# Patient Record
Sex: FEMALE | Race: WHITE | Employment: FULL TIME | URBAN - NONMETROPOLITAN AREA
[De-identification: names, ages, dates, MRNs, and addresses within clinical notes are randomized per-mention and may not be internally consistent; named-entity substitution may affect disease eponyms.]

---

## 2017-05-25 LAB — FASTING: YES

## 2019-04-12 ENCOUNTER — OFFICE VISIT (OUTPATIENT)
Dept: FAMILY MEDICINE CLINIC | Age: 54
End: 2019-04-12
Payer: COMMERCIAL

## 2019-04-12 VITALS
RESPIRATION RATE: 10 BRPM | TEMPERATURE: 98.8 F | DIASTOLIC BLOOD PRESSURE: 88 MMHG | BODY MASS INDEX: 35.12 KG/M2 | WEIGHT: 198.2 LBS | HEART RATE: 86 BPM | SYSTOLIC BLOOD PRESSURE: 128 MMHG | HEIGHT: 63 IN | OXYGEN SATURATION: 98 %

## 2019-04-12 DIAGNOSIS — E06.3 HASHITOXICOSIS: ICD-10-CM

## 2019-04-12 DIAGNOSIS — G44.89 OTHER HEADACHE SYNDROME: ICD-10-CM

## 2019-04-12 DIAGNOSIS — R53.83 TIRED: ICD-10-CM

## 2019-04-12 DIAGNOSIS — N31.2 HYPOTONIC BLADDER: Primary | ICD-10-CM

## 2019-04-12 DIAGNOSIS — G47.9 SLEEP DIFFICULTIES: ICD-10-CM

## 2019-04-12 DIAGNOSIS — E61.8 IODINE DEFICIENCY: ICD-10-CM

## 2019-04-12 DIAGNOSIS — R35.1 NOCTURIA: ICD-10-CM

## 2019-04-12 DIAGNOSIS — R14.0 BLOATING: ICD-10-CM

## 2019-04-12 DIAGNOSIS — E05.80 HASHITOXICOSIS: ICD-10-CM

## 2019-04-12 DIAGNOSIS — R63.5 WEIGHT INCREASE: ICD-10-CM

## 2019-04-12 DIAGNOSIS — K91.5 POST-CHOLECYSTECTOMY SYNDROME: ICD-10-CM

## 2019-04-12 DIAGNOSIS — R73.9 BLOOD GLUCOSE ELEVATED: ICD-10-CM

## 2019-04-12 PROCEDURE — 99204 OFFICE O/P NEW MOD 45 MIN: CPT | Performed by: FAMILY MEDICINE

## 2019-04-12 RX ORDER — UREA 10 %
1 LOTION (ML) TOPICAL NIGHTLY PRN
COMMUNITY

## 2019-04-12 RX ORDER — ZINC GLUCONATE 50 MG
1 TABLET ORAL NIGHTLY PRN
COMMUNITY

## 2019-04-12 RX ORDER — MULTIVITAMIN WITH IRON
250 TABLET ORAL NIGHTLY
COMMUNITY
End: 2019-07-26

## 2019-04-12 ASSESSMENT — ENCOUNTER SYMPTOMS
RESPIRATORY NEGATIVE: 1
ABDOMINAL PAIN: 1
ABDOMINAL DISTENTION: 1

## 2019-04-12 ASSESSMENT — PATIENT HEALTH QUESTIONNAIRE - PHQ9
SUM OF ALL RESPONSES TO PHQ QUESTIONS 1-9: 0
1. LITTLE INTEREST OR PLEASURE IN DOING THINGS: 0
SUM OF ALL RESPONSES TO PHQ QUESTIONS 1-9: 0
2. FEELING DOWN, DEPRESSED OR HOPELESS: 0
SUM OF ALL RESPONSES TO PHQ9 QUESTIONS 1 & 2: 0

## 2019-04-12 NOTE — PATIENT INSTRUCTIONS
1. You may receive a survey about your visit with us today. The feedback from our patients helps us identify what is working well and where the service to all patients can be enhanced. Thank you! 2. Please bring in ALL medications BOTTLES, including inhalers, herbal supplements, over the counter, prescribed & non-prescribed medicine. The office would like actual medication bottles and a list.   3. Please note our OFFICE POLICIES:   1. Prior to getting your labs drawn, please check with your insurance company for benefits and eligibility of lab services. Often, insurance companies cover certain tests for preventative visits only. It is patient's responsibility to see what is covered. 2. We are unable to change a diagnosis after the test has been performed. 3. Lab orders will not be re-printed. Please hold onto your original lab orders and take them to your lab to be completed. 4. If you no show your scheduled appointment three times, you will be dismissed from this practice. 5. Reschedules must be completed 24 hours prior to your schedule appointment. 4. If the list below has been completed, PLEASE FAX RECORDS TO OUR OFFICE @ [phone]. Once the records have been received we will update your records at our office:  Health Maintenance Due   Topic Date Due    HIV screen  10/17/1981    DTaP/Tdap/Td vaccine (1 - Tdap) 10/17/1985    Cervical cancer screen  10/17/1987    Breast cancer screen  10/17/2016    Shingles Vaccine (1 of 2) 10/17/2016    Colon cancer screen colonoscopy  10/17/2016       Schedule your 2018/2019 flu vaccine with Dr. Mervin Fink call [phone]!   [facility], for anyone 9 years or older   [address]   Every Monday   8:00am-11:00am and 1:00pm-3:00pm
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
## 2019-04-15 NOTE — TELEPHONE ENCOUNTER
Best to ask schwbaudilioermans to request a refill for a compounded product based on their rx history

## 2019-04-15 NOTE — TELEPHONE ENCOUNTER
Melina Mireles called from Pharmacy:  [address] [place] [address] [phone] - F [phone], so she said [facility] need [facility],    please advise:  Liothyronine 12 mcg   Dose: 12 mcg? Frequency: Daily? Refills? Quantity ?

## 2019-04-15 NOTE — TELEPHONE ENCOUNTER
Patient needs a refill for Liothyronine 12 mcg which gets compounded at [facility] in [place]. I cannot get the correct dosage to appear in epic. Will you see if you are able?

## 2019-04-16 LAB
AVERAGE GLUCOSE: NORMAL
CHOLESTEROL, TOTAL: 178 MG/DL
CHOLESTEROL/HDL RATIO: NORMAL
HBA1C MFR BLD: 5.4 %
HDLC SERPL-MCNC: 61 MG/DL (ref 35–70)
LDL CHOLESTEROL CALCULATED: 90 MG/DL (ref 0–160)
TRIGL SERPL-MCNC: 135 MG/DL
VLDLC SERPL CALC-MCNC: 27 MG/DL

## 2019-04-19 ENCOUNTER — OFFICE VISIT (OUTPATIENT)
Dept: FAMILY MEDICINE CLINIC | Age: 54
End: 2019-04-19
Payer: COMMERCIAL

## 2019-04-19 VITALS
SYSTOLIC BLOOD PRESSURE: 138 MMHG | HEART RATE: 86 BPM | DIASTOLIC BLOOD PRESSURE: 88 MMHG | BODY MASS INDEX: 35.19 KG/M2 | OXYGEN SATURATION: 98 % | TEMPERATURE: 98.6 F | HEIGHT: 63 IN | RESPIRATION RATE: 10 BRPM | WEIGHT: 198.6 LBS

## 2019-04-19 DIAGNOSIS — G44.89 OTHER HEADACHE SYNDROME: ICD-10-CM

## 2019-04-19 DIAGNOSIS — E06.3 HASHITOXICOSIS: ICD-10-CM

## 2019-04-19 DIAGNOSIS — R35.1 NOCTURIA: ICD-10-CM

## 2019-04-19 DIAGNOSIS — R53.83 TIRED: ICD-10-CM

## 2019-04-19 DIAGNOSIS — R63.5 WEIGHT INCREASE: ICD-10-CM

## 2019-04-19 DIAGNOSIS — E05.80 HASHITOXICOSIS: ICD-10-CM

## 2019-04-19 DIAGNOSIS — R14.0 BLOATING: ICD-10-CM

## 2019-04-19 DIAGNOSIS — N31.2 HYPOTONIC BLADDER: Primary | ICD-10-CM

## 2019-04-19 PROCEDURE — 99214 OFFICE O/P EST MOD 30 MIN: CPT | Performed by: FAMILY MEDICINE

## 2019-04-19 RX ORDER — CHLORAL HYDRATE 500 MG
3 CAPSULE ORAL
COMMUNITY

## 2019-04-19 NOTE — PATIENT INSTRUCTIONS
1. You may receive a survey about your visit with us today. The feedback from our patients helps us identify what is working well and where the service to all patients can be enhanced. Thank you! 2. Please bring in ALL medications BOTTLES, including inhalers, herbal supplements, over the counter, prescribed & non-prescribed medicine. The office would like actual medication bottles and a list.   3. Please note our OFFICE POLICIES:   1. Prior to getting your labs drawn, please check with your insurance company for benefits and eligibility of lab services. Often, insurance companies cover certain tests for preventative visits only. It is patient's responsibility to see what is covered. 2. We are unable to change a diagnosis after the test has been performed. 3. Lab orders will not be re-printed. Please hold onto your original lab orders and take them to your lab to be completed. 4. If you no show your scheduled appointment three times, you will be dismissed from this practice. 5. Reschedules must be completed 24 hours prior to your schedule appointment. 4. If the list below has been completed, PLEASE FAX RECORDS TO OUR OFFICE @ [phone]. Once the records have been received we will update your records at our office:  Health Maintenance Due   Topic Date Due    HIV screen  10/17/1981    DTaP/Tdap/Td vaccine (1 - Tdap) 10/17/1985    Cervical cancer screen  10/17/1987    Diabetes screen  09/18/2016    Breast cancer screen  10/17/2016    Shingles Vaccine (1 of 2) 10/17/2016    Colon cancer screen colonoscopy  10/17/2016       Schedule your 2018/2019 flu vaccine with Dr. Karrie Meier call [phone]!   [facility], for anyone 9 years or older   [address]   Every Monday   8:00am-11:00am and 1:00pm-3:00pm

## 2019-04-19 NOTE — PROGRESS NOTES
(before meals and nightly), Disp: , Rfl:     Nutritional Supplements (DHEA PO), Take 25 mg by mouth nightly, Disp: , Rfl:     Lysine 500 MG CAPS, Take 1 capsule by mouth 2 times daily as needed, Disp: , Rfl:     Cholecalciferol (VITAMIN D3) 5000 units TABS, Take 1 tablet by mouth daily Double Sundays, Disp: , Rfl:     magnesium (MAGNESIUM-OXIDE) 250 MG TABS tablet, Take 250 mg by mouth nightly, Disp: , Rfl:     melatonin 1 MG tablet, Take 1 mg by mouth nightly as needed for Sleep, Disp: , Rfl:     Zinc 50 MG TABS, Take 1 tablet by mouth nightly as needed, Disp: , Rfl:     TURMERIC PO, Take 1 capsule by mouth daily, Disp: , Rfl:     Probiotic Product (PROBIOTIC ADVANCED PO), Take 2 tablets by mouth daily (with breakfast), Disp: , Rfl:     D-Mannose 500 MG CAPS, Take 3 tablets by mouth daily, Disp: , Rfl:     NONFORMULARY, DuoZyme- 1 capsule tid, Disp: , Rfl:     NONFORMULARY, Orqu[employer] Brand: Relora Plus- 1 cap tid Stress B complex- 1 cap bid Adrenal Cortex- 2 cap in morning, 1 cap at noon, Disp: , Rfl:     IODINE, KELP, PO, Take 225 mcg by mouth daily, Disp: , Rfl:     NONFORMULARY, Liposomal transdermal Progesterone- 1 spray up to tid, Disp: , Rfl:     Misc Natural Products (COLON CLEANSE) CAPS, Take by mouth nightly as needed, Disp: , Rfl:     Misc Natural Products (IMMUNE FORMULA PO), Take 3 capsules by mouth 2 times daily, Disp: , Rfl:     NONFORMULARY, Now Brand:  Dopa Mucuna- 1 cap daily, Disp: , Rfl:     NONFORMULARY, Premium cleanse- 1 tab daily for colon health, Disp: , Rfl:     progesterone (PROMETRIUM) 100 MG capsule, Take 1 capsule by mouth nightly, Disp: 30 capsule, Rfl: 5    Thyroid (LEVOTHYROXINE-LIOTHYRONINE) 30 MG TABS, Take 12 mcg by mouth daily, Disp: 30 tablet, Rfl: 3  Allergies: Latex,  Immunizations: There is no immunization history on file for this patient. History of PresentIllness:     Criselda's had concerns including Follow-up (1 WEEK). Kelly Grimm  [facility];   Chief Complaint   Patient presents with    Follow-up     1 WEEK   , ,  abnormal labs follow up and these conditions as she  Is looking today for:     1. Hypotonic bladder    2. Bloating    3. Hashitoxicosis    4. Nocturia    5. Weight increase    6. Other headache syndrome    7. Tired      HPI    Subjective:     Review of Systems   Constitutional: Positive for fatigue and unexpected weight change. Musculoskeletal: Positive for arthralgias and myalgias. All other systems reviewed and are negative. Objective:     /88 (Site: Right Upper Arm, Position: Sitting, Cuff Size: Medium Adult)   Pulse 86   Temp 98.6 °F (37 °C) (Oral)   Resp 10   Ht 5' 2.52\" (1.588 m)   Wt 198 lb 9.6 oz (90.1 kg)   LMP 08/17/1997   SpO2 98%   BMI 35.72 kg/m²   Physical Exam   Constitutional: She is oriented to person, place, and time. She appears well-developed and well-nourished. HENT:   Head: Normocephalic. Pulmonary/Chest: Effort normal.   Neurological: She is alert and oriented to person, place, and time. Psychiatric: She has a normal mood and affect. Thought content normal.   Nursing note and vitals reviewed. Laboratory Data:   Lab results were searched in Care Everywhere and/or those brought by the pateint were reviewed today with Ariel Licea and she has a copy of their most recent labs to take home with them as notedbelow;       Imaging Data:   Imaging Data:       Assessment & Plan:       Impression:  1. Hypotonic bladder    2. Bloating    3. Hashitoxicosis    4. Nocturia    5. Weight increase    6. Other headache syndrome    7.  Tired      Assessment and Plan:  After reviewing the patients chief complaints, reviewing their labfindings in great detail (with the patient and those accompanying them) which correlate to their chief complaints, symptoms, and or medical conditions; suggestions were made relating to changes in diet and or supplementswhich may improve the complaints and which will be reflected in their future lab findings; Chief Complaint   Patient presents with    Follow-up     1 WEEK   ;    Plans for the next visits:  - Abnormal and non-optimal Labs were ordered today to be repeated in the next 120-365 days to assess changes from adjustments in nutrition and or nutrients. - Patient instructed when having ablood draw to ask the  to divide their lab draws into multiple draws over several days if not feeling good at the time of the lab draw or if either prefers to do several smaller blood draws over several days  -Patient instructed to check with insurer before each lab draw and to to to the lab which the insurer directs them for the most cost effective lab draw with the least patient's cost  - [place] Edie  will be scheduled subsequentto those results. Amanda Morejon will bring in her drink and food log to her next visit    Chronic Problems Addressed on this Visit:                                   1.  Intensity of Service; Uncontrolled items at this visit; Chief Complaint   Patient presents with    Follow-up     1 WEEK   ; Improved items at this visit; Stable items atthis visit;  2. Patients food and drinks were reviewed with the patient,       - Cameron Bowiedarrick will bring food+drink symptom log to next visit for inclusion in their record      - 75 better food list reviewed & given topatient with the omega 6 food list to avoid         - Gluten in corn and oats abstracts sheet reviewed and given to the patient today   3. Greater than 25 minutes were spent face to face on this visit of which >50% was for counseling and coordination of care.       Patients food and drinks were reviewed with thepatient,   - they will bring a food drink symptom log to future visits for inclusion in their record    - 75 better food list reviewed & given to patient along with the omega 6 food list to avoid      - Glutenin corn and oats abstracts sheet reviewed and given to the patient today    - 23 Foods containing Latex-like proteins was reviewed and copy to be taken if desired     - Nutrient Supplements list provided and copyto be taken if desired    - [employer] web site offered to patient to review at their convenience by staff with login information    Note:  I have discussed with the patient that with all nutraceuticals, there is often mixed data and emerging research which needs to be monitored; as well as an array of NIHfact sheets on nutrients and supplements. If I have recommended cinnamon at the request of this patient to assist them in control of their blood sugar, triglyceride and or weight issues. I discussed that thepatient's clinical use of cinnamon bark, calcium, magnesium, Vitamin D and pharmaceutical grade [employer] #216432 fish oil or triple-strength fish oil, and B-75 two phase time-released B complex by Charles Wright will be for atime-limited trial to determine their individual effectiveness and safety in this patient. I also referred the patient to the NMCD: Nutrition, Metabolism, and Cardiovascular Diseases (journal) and concerns about long-termuse and hepatotoxicity of cinnamon and other nutrients and suggest they frequently search nih.gov for the latest non-proprietary information on nutriceuticals as well as consider a subscription to [employer] fordetails on reviewed supplements, or at the least review the nutrient files at [address] at [facility], [employer], an insulin mimetic, reduces some High Carbohydrate Dietary Impacts. Methylhydroxychalcone polymers insulin-enhancing properties in fat cells are responsible for enhanced glucose uptake, inhibiting hepatic HMG-CoA reductase and lowers lipids. www.jacn. org/content/20/4/327.full     But cinnamon with additivessuch as Chromium or Cinnamon Extract are not effective as insulin mimetics.  https://www.hanley.net/ Nutrients for Start up from [employer] or [employer] for ease to get started now ;  Lizzeth Ross has some useable products;  - Triple Strength Fish Oil, enteric coated  - Vit D 3 5000 IU gel caps  - Iron ferrous sulfat 325 mg tabs  - Centrum Silver look-a-like for most patients, or  - Centrum plain look-a-like if need iron    Localpharmacies or chains such as [employer], [employer], [employer], have;  - Triple Strength Fish Oil (enteric coated ifavailable) or    If not enteric coated, can take from freezer for less burps  - B-50 or B-100 time released balanced B complex tabs  - Cinnamon bark 500 mg (without Chromium or extracts)   some brands list 1000 mg / serving of 2 capsules,    some brands have 1000 mg caps with the undesireable chromium / extract  - Calcium carbonate/citrate, magnesium oxide/citrate, Vit D 3  as 3-4 tabs/caps/serving     Some Local Brands may contain Zincwhich is acceptable for the first bottle or two  - Magnesium oxide 250 mg tabs for those having < 2 bowel movements daily  - Magnesium citrate 200 mg if having > 2bowel movement/day  - Centrum Silver or look-a-like for most patients, Centrum plain or look-a-like with iron  - Vitamin D-3 comes as 1,000 IU or 2,000 IU or 5,000 IU gel caps or Liquid drops      Some brands containing or derived from soy oil or corn oil are OK if not allergic to soy  - Elemental Iron 65 mg tabsat bedtime is available over the counter if need more iron     Usually turns bowel movements grey, green or black but not a concern  - Apricot Kernel Oil (by Now) for dry skin sensitive perineal or perianal area skin    Nutrients for ongoing use by Mail order for less expense from www. puritan.com ;  - Triple Strength Fish Oil , 240 Softgels Item [id number]  -B-100 time released balanced B complex Item #[id number]  - Cinnamon bark 500 mg without Chromium or extract Item #[id number]  - Calcium carbonate 1000 mg, Magnesium oxide 500 mg, Vit D 3  400 IU Item #[id number]  - Magnesium oxide 500 mg tabs inflame our immunesystems  How Can I Have A Latex Allergy? Eating foods with latex-like protein exposes us to latex allergies. Our body cannot tell the differencebetween these latex-like proteins and latex from rubber products since many people are allergic to fruit, vegetables and latex. Read labels on pre-packaged foods. This list to avoid is only a guide if you are known allergicto latex or have a latex rash on your chin, cheeks and lines on your neck and chest. The amount of latex is different in each food product or fruit variety. Foods to Avoid out of Season if not grown locally: Melon, Nectarine, Papaya, Cherry, Passion fruit, Plum, Chestnuts, and Tomato. Avocado, Banana, Celery, Figs, and Kiwi always contain Latex-like protein. Whats in Season? Strawberries taste better in June than December because June is strawberry season so buy locally grown produce \"in season\" for the best flavor, cost and less Latex. Locally grown produce notonly tastes great requires little of no ethylene exposure in food distribution so has less latex content. Out of season, use canned, frozen or dried sinceprocessed ripe and are latex lower!!!   Month     [place] LocallyGrown Produce  January, February, March: use canned, frozen or dried fruits since lower in latex  April; asparagus, radishes  May; asparagus, broccoli, green onions, greens, peas, radishes,rhubarb  June; asparagus, beets, beans, broccoli, cabbage, cantaloupe, carrots, green onions, greens, lettuce,onions, parsley, peas, radishes, rhubarb, strawberries, watermelons  July; beans, beets, blueberries,broccoli, cabbage, cantaloupe, carrots, cauliflower, celery, cucumbers, eggplant, grapes, green onions, greens, lettuce, onions, parsley, peas, peaches, bell peppers, potatoes, radishes, summer raspberries, squash, sweetcorn, tomatoes, turnips, watermelons  August; apples, beans, beets, blueberries, cabbage, cantaloupe, carrots,cauliflower, celery, cucumbers, eggplant, grapes, green onions, greens, lettuce, onions, parsley, peas, peaches, pears, bell peppers, potatoes, radishes, squash, sweet corn, tomatoes, turnips, watermelons  September; apples, beans, beets, blueberries, cabbage, cantaloupe, carrots, cauliflower, celery, cucumbers, eggplant, grapes,green onions, greens, lettuce, onions, parsley, peas, peaches, pears, bell peppers, plums, potatoes, pumpkins, radishes, fall red raspberries, squash, sweet corn, tomatoes, turnips, watermelons  October; apples, beets, broccoli, cabbage, carrots, cauliflower, celery, green onions, greens, lettuce, parsley, peas, pears, potatoes,pumpkins, radishes, fall red raspberries, squash, turnips  November; broccoli, cabbage, carrots, parsley,pears, peas  December: use canned, frozen ordried fruits since lower in latex    Upto half of latex-sensitive patients show allergic reactions to fruits (avocados, bananas, kiwifruits, papayas, peaches),   Annals of Allergy, 1994. These plants contain the same proteins that are allergens in latex. People with fruit allergies should warn physicians beforeundergoing procedures which may cause anaphylactic reaction if in contact with latex gloves. Some of the common foods with defined cross-reactivity to latexare avocado, banana, kiwi, chestnut, raw potato, tomato,stone fruits (e.g., peach, cherry), hazelnut, melons, celery, carrot, apple, pear, papaya, and almond. Foods with less well-defined cross-reactivity to latex are peanuts, peppers, citrus fruits, coconut, pineapple, aldo,fig, passion fruit, Ugli fruit, and grape    This fruit/latex cross-reactivity is worsened by ethylene, a gas used to hasten commercial ripening. In nature, plants produce low levels of the hormone ethylene, which regulates germination, flowering, and ripening.  Forced ripening by high ethyleneconcentrations, plants produce allergenic wound-repair proteins, which are similar to wound-repair proteins made during the tapping of rubber trees. Sensitive individualswho ingest the fruit get a higher dose and worse reaction. Some people may even first become sensitized to latex through fruit. Can food processing increase theconcentrations of allergenic proteins? Latex-sensitized children (and adults) in [place] often experience allergic reactions after eating bananas ripenedartificially with ethylene. In the [place], food distribution centers treat unripe bananas and other produce with ethylene to ripen; not commonly done in [facility] since fruit is tree-ripened there. Does treatmentof food with ethylene induce banana proteins that cross-react with latex? (Bucky et al.    References:   Latex in Foods Allergy, http://ehp.niehs.nih.gov/members/2003/5811/5811.html    Search web for \" Whats in Season \" for whereyou live or are at the time you food shop  www.nutritioncouncil.org/pdf/healthy/SeasonalProduce. pdf ,   Management of Latex, http://medicalcenter. osu.edu/  search for latex

## 2019-05-16 ENCOUNTER — TELEPHONE (OUTPATIENT)
Dept: FAMILY MEDICINE CLINIC | Age: 54
End: 2019-05-16

## 2019-07-26 ENCOUNTER — OFFICE VISIT (OUTPATIENT)
Dept: FAMILY MEDICINE CLINIC | Age: 54
End: 2019-07-26
Payer: COMMERCIAL

## 2019-07-26 VITALS
RESPIRATION RATE: 10 BRPM | BODY MASS INDEX: 35.54 KG/M2 | TEMPERATURE: 98.7 F | WEIGHT: 200.6 LBS | DIASTOLIC BLOOD PRESSURE: 84 MMHG | HEIGHT: 63 IN | OXYGEN SATURATION: 97 % | HEART RATE: 72 BPM | SYSTOLIC BLOOD PRESSURE: 122 MMHG

## 2019-07-26 DIAGNOSIS — R35.1 NOCTURIA: ICD-10-CM

## 2019-07-26 DIAGNOSIS — R73.9 BLOOD GLUCOSE ELEVATED: ICD-10-CM

## 2019-07-26 DIAGNOSIS — N31.2 HYPOTONIC BLADDER: Primary | ICD-10-CM

## 2019-07-26 DIAGNOSIS — E05.80 HASHITOXICOSIS: ICD-10-CM

## 2019-07-26 DIAGNOSIS — G44.89 OTHER HEADACHE SYNDROME: ICD-10-CM

## 2019-07-26 DIAGNOSIS — G47.9 SLEEP DIFFICULTIES: ICD-10-CM

## 2019-07-26 DIAGNOSIS — K91.5 POST-CHOLECYSTECTOMY SYNDROME: ICD-10-CM

## 2019-07-26 DIAGNOSIS — E06.3 HASHITOXICOSIS: ICD-10-CM

## 2019-07-26 DIAGNOSIS — R63.5 WEIGHT INCREASE: ICD-10-CM

## 2019-07-26 DIAGNOSIS — E61.8 IODINE DEFICIENCY: ICD-10-CM

## 2019-07-26 DIAGNOSIS — R53.83 TIRED: ICD-10-CM

## 2019-07-26 DIAGNOSIS — R14.0 BLOATING: ICD-10-CM

## 2019-07-26 PROCEDURE — 99214 OFFICE O/P EST MOD 30 MIN: CPT | Performed by: FAMILY MEDICINE

## 2019-07-26 ASSESSMENT — ENCOUNTER SYMPTOMS: BACK PAIN: 1

## 2019-07-26 NOTE — PROGRESS NOTES
Rfl:     B Complex-Folic Acid (Z-375 BALANCED TR PO), Take 1 tablet by mouth 3 times daily (before meals) [facility], Disp: , Rfl:     magnesium cl-calcium carbonate (SLOW-MAG) 71.5-119 MG TBEC tablet, Take 1 tablet by mouth 3 times daily (before meals) , Disp: , Rfl:     Chromium-Cinnamon (CINNAMON PLUS CHROMIUM)  MCG-MG CAPS, Take 3 capsules by mouth 4 times daily (before meals and nightly) [facility], Disp: , Rfl:     Omega-3 1000 MG CAPS, Take 1 capsule by mouth 4 times daily (before meals and nightly), Disp: , Rfl:     Nutritional Supplements (DHEA PO), Take 10 mg by mouth every morning (before breakfast) [employer] or [employer], Disp: , Rfl:     Lysine 500 MG CAPS, Take 1 capsule by mouth 2 times daily as needed, Disp: , Rfl:     Cholecalciferol (VITAMIN D3) 5000 units TABS, Take 1 tablet by mouth daily Double Sundays, Disp: , Rfl:     melatonin 1 MG tablet, Take 1 mg by mouth nightly as needed for Sleep, Disp: , Rfl:     Zinc 50 MG TABS, Take 1 tablet by mouth nightly as needed, Disp: , Rfl:     TURMERIC PO, Take 1 capsule by mouth daily, Disp: , Rfl:     Probiotic Product (PROBIOTIC ADVANCED PO), Take 2 tablets by mouth daily (with breakfast), Disp: , Rfl:     D-Mannose 500 MG CAPS, Take 3 tablets by mouth daily as needed , Disp: , Rfl:     NONFORMULARY, DuoZyme- 1 capsule tid, Disp: , Rfl:     NONFORMULARYOrquidea Brand: Relora Plus- 1 cap tid Stress B complex- 1 cap bid Adrenal Cortex- 2 cap in morning, 1 cap at noon, Disp: , Rfl:     IODINE, KELP, PO, Take 225 mcg by mouth daily, Disp: , Rfl:     NONFORMULARY, Liposomal transdermal Progesterone- 1 spray up to tid, Disp: , Rfl:     Misc Natural Products (COLON CLEANSE) CAPS, Take by mouth nightly as needed, Disp: , Rfl:     Misc Natural Products (IMMUNE FORMULA PO), Take 3 capsules by mouth 2 times daily, Disp: , Rfl:     NONFORMULARY, Now Brand:  Dopa Mucuna- 1 cap daily prn, Disp: , Rfl:     progesterone them for the most cost effective lab draw with the least patient's cost  - Jennifer Acosta  will be scheduled subsequentto those results. Martina Hodges will bring in her drink and food log to her next visit    Chronic Problems Addressed on this Visit:                                   1.  Intensity of Service; Uncontrolled items at this visit; Chief Complaint   Patient presents with    Follow-up     2 month    Weight Gain    Skin Problem     rash on sides of neck comes and goes    Fatigue     more than normal    Urinary Tract Infection     2 episodes since last visit, e coli    Joint Pain     left ankle    Back Pain   ; Improved items at this visit; Stable items atthis visit;  2. Patients food and drinks were reviewed with the patient,       - Jennifer Acosta will bring food+drink symptom log to next visit for inclusion in their record      - 75 better food list reviewed & given topatient with the omega 6 food list to avoid         - Gluten in corn and oats abstracts sheet reviewed and given to the patient today   3. Greater than 25 minutes were spent face to face on this visit of which >50% was for counseling and coordination of care. Patients food and drinks were reviewed with thepatient,   - they will bring a food drink symptom log to future visits for inclusion in their record    - 75 better food list reviewed & given to patient along with the omega 6 food list to avoid      - Glutenin corn and oats abstracts sheet reviewed and given to the patient today    - 23 Foods containing Latex-like proteins was reviewed and copy to be taken if desired     - Nutrient Supplements list provided and copyto be taken if desired    - [email or web address]. [employer] web site offered to patient to review at their convenience by staff with login information    Note:  I have discussed with the patient that with all nutraceuticals, there is often mixed data and emerging research which needs to be monitored; as well as an enteric coated, can take from freezer for less burps  - B-50 or B-100 time released balanced B complex tabs  - Cinnamon bark 500 mg (without Chromium or extracts)   some brands list 1000 mg / serving of 2 capsules,    some brands have 1000 mg caps with the undesireable chromium / extract  - Calcium carbonate/citrate, magnesium oxide/citrate, Vit D 3  as 3-4 tabs/caps/serving     Some Local Brands may contain Zincwhich is acceptable for the first bottle or two  - Magnesium oxide 250 mg tabs for those having < 2 bowel movements daily  - Magnesium citrate 200 mg if having > 2bowel movement/day  - Centrum Silver or look-a-like for most patients, Centrum plain or look-a-like with iron  - Vitamin D-3 comes as 1,000 IU or 2,000 IU or 5,000 IU gel caps or Liquid drops      Some brands containing or derived from soy oil or corn oil are OK if not allergic to soy  - Elemental Iron 65 mg tabsat bedtime is available over the counter if need more iron     Usually turns bowel movements grey, green or black but not a concern  - Apricot Kernel Oil (by Now) for dry skin sensitive perineal or perianal area skin    Nutrients for ongoing use by Mail order for less expense from www. puritan.com ;  - Triple Strength Fish Oil , 240 Softgels Item [id number]  -B-100 time released balanced B complex Item #[id number]  - Cinnamon bark 500 mg without Chromium or extract Item #[id number]  - Calcium carbonate 1000 mg, Magnesium oxide 500 mg, Vit D 3  400 IU Item #[id number]  - Magnesium oxide 500 mg tabs Item #157301 if less than 2 bowel movements daily  - [employer] Item #[id number] for mostpatients, One Daily Item #516503 with iron  - Vit D 3  1,000 Item #[id number]      2,000 IU Item #[id number]  5,000 IU Item #[id number]     Some brands containing orderived from soy oil or corn oil are OK if not allergic to soy    Nutrients for Special Needs by Mayi Blas for less expense from www. puritan.com ;  -Elemental Iron 65 mg tabs Item #416544 if need more iron for low iron on labs

## 2019-11-07 LAB
AVERAGE GLUCOSE: NORMAL
HBA1C MFR BLD: 5.3 %

## 2019-11-18 ENCOUNTER — OFFICE VISIT (OUTPATIENT)
Dept: FAMILY MEDICINE CLINIC | Age: 54
End: 2019-11-18
Payer: COMMERCIAL

## 2019-11-18 VITALS
RESPIRATION RATE: 10 BRPM | WEIGHT: 197.6 LBS | HEART RATE: 86 BPM | BODY MASS INDEX: 35.01 KG/M2 | HEIGHT: 63 IN | DIASTOLIC BLOOD PRESSURE: 72 MMHG | SYSTOLIC BLOOD PRESSURE: 114 MMHG | OXYGEN SATURATION: 97 % | TEMPERATURE: 98.5 F

## 2019-11-18 DIAGNOSIS — R53.83 TIRED: ICD-10-CM

## 2019-11-18 DIAGNOSIS — R14.0 BLOATING: ICD-10-CM

## 2019-11-18 DIAGNOSIS — N31.2 HYPOTONIC BLADDER: Primary | ICD-10-CM

## 2019-11-18 DIAGNOSIS — K91.5 POST-CHOLECYSTECTOMY SYNDROME: ICD-10-CM

## 2019-11-18 DIAGNOSIS — G44.89 OTHER HEADACHE SYNDROME: ICD-10-CM

## 2019-11-18 DIAGNOSIS — N39.0 RECURRENT UTI: ICD-10-CM

## 2019-11-18 DIAGNOSIS — E06.3 HASHITOXICOSIS: ICD-10-CM

## 2019-11-18 DIAGNOSIS — E61.8 IODINE DEFICIENCY: ICD-10-CM

## 2019-11-18 DIAGNOSIS — R63.5 WEIGHT INCREASE: ICD-10-CM

## 2019-11-18 DIAGNOSIS — R35.1 NOCTURIA: ICD-10-CM

## 2019-11-18 DIAGNOSIS — R73.9 BLOOD GLUCOSE ELEVATED: ICD-10-CM

## 2019-11-18 DIAGNOSIS — E05.80 HASHITOXICOSIS: ICD-10-CM

## 2019-11-18 DIAGNOSIS — G47.9 SLEEP DIFFICULTIES: ICD-10-CM

## 2019-11-18 PROCEDURE — 99214 OFFICE O/P EST MOD 30 MIN: CPT | Performed by: FAMILY MEDICINE

## 2019-11-18 RX ORDER — TRETINOIN 0.5 MG/G
CREAM TOPICAL DAILY
COMMUNITY
Start: 2019-08-21

## 2019-11-18 ASSESSMENT — ENCOUNTER SYMPTOMS: BACK PAIN: 1

## 2019-11-25 ENCOUNTER — PATIENT MESSAGE (OUTPATIENT)
Dept: FAMILY MEDICINE CLINIC | Age: 54
End: 2019-11-25

## 2019-11-26 ENCOUNTER — TELEPHONE (OUTPATIENT)
Dept: FAMILY MEDICINE CLINIC | Age: 54
End: 2019-11-26

## 2019-11-26 RX ORDER — SULFAMETHOXAZOLE AND TRIMETHOPRIM 800; 160 MG/1; MG/1
1 TABLET ORAL 2 TIMES DAILY
Qty: 20 TABLET | Refills: 0 | Status: SHIPPED | OUTPATIENT
Start: 2019-11-26 | End: 2019-12-06 | Stop reason: SDUPTHER

## 2019-12-06 ENCOUNTER — PATIENT MESSAGE (OUTPATIENT)
Dept: FAMILY MEDICINE CLINIC | Age: 54
End: 2019-12-06

## 2019-12-06 RX ORDER — SULFAMETHOXAZOLE AND TRIMETHOPRIM 800; 160 MG/1; MG/1
1 TABLET ORAL 2 TIMES DAILY
Qty: 20 TABLET | Refills: 0 | Status: SHIPPED | OUTPATIENT
Start: 2019-12-06 | End: 2019-12-16

## 2019-12-10 ENCOUNTER — PATIENT MESSAGE (OUTPATIENT)
Dept: FAMILY MEDICINE CLINIC | Age: 54
End: 2019-12-10

## 2019-12-10 ENCOUNTER — TELEPHONE (OUTPATIENT)
Dept: FAMILY MEDICINE CLINIC | Age: 54
End: 2019-12-10

## 2019-12-11 RX ORDER — LEVOTHYROXINE AND LIOTHYRONINE 19; 4.5 UG/1; UG/1
30 TABLET ORAL DAILY
Qty: 90 TABLET | Refills: 3 | Status: SHIPPED | OUTPATIENT
Start: 2019-12-11 | End: 2020-12-15 | Stop reason: SDUPTHER

## 2020-05-19 ENCOUNTER — TELEMEDICINE (OUTPATIENT)
Dept: FAMILY MEDICINE CLINIC | Age: 55
End: 2020-05-19
Payer: COMMERCIAL

## 2020-05-19 ENCOUNTER — TELEPHONE (OUTPATIENT)
Dept: FAMILY MEDICINE CLINIC | Age: 55
End: 2020-05-19

## 2020-05-19 PROCEDURE — 99214 OFFICE O/P EST MOD 30 MIN: CPT | Performed by: FAMILY MEDICINE

## 2020-05-19 ASSESSMENT — ENCOUNTER SYMPTOMS: BACK PAIN: 1

## 2020-05-19 NOTE — PROGRESS NOTES
[address] [facility] [address] [address]  Dept: [phone]  Dept Fax: [phone]  Loc: [phone]      Karyn Antoine is a 49 y.o. White female. GRICELDA  presents to the [facility] Medicine-Residency clinic today doxy,me video visit was performed via a 'synchronous telecommunication system and the Location of the Patient was in their Home, while the Location of the provider was in the provider's home for   Chief Complaint   Patient presents with   [address]   ,  and;   2. Nocturia    3. Tired    4. Post-cholecystectomy syndrome    5. Blood glucose elevated    6. Sleep difficulties    7. Weight increase    8. Bloating    9. Hashitoxicosis    10. Other headache syndrome    11. Iodine deficiency    12. Recurrent UTI      I have reviewed Karyn Antoine medical, surgical and other pertinent history in detail, and have updated medication and allergy information in the computerized patientrecord. Clinical Care Team:     -Referring Provider for today's consult: Self Referred  -Primary Care Provider: Neha Mart MD    Medical/Surgical History:   She  has a past medical history of Diverticula, colon and Neck pain. Her  has a past surgical history that includes Hysterectomy and Dilation and curettage of uterus. Family/Social History:     Her family history includes Arthritis in her father; Asthma in her father; Cancer in her father; Heart Disease in her father and mother; High Blood Pressure in her father; Stroke in her brother. She  reports that she has never smoked. She has never used smokeless tobacco. She reports current alcohol use. She reports that she does not use drugs.     Medications/Allergies/Immunizations:     Her current medication(s) include   Current Outpatient Medications:     thyroid (ARMOUR) 30 MG tablet, Take 1 tablet by mouth daily Take 12 mcg by mouth daily, Disp: 90 tablet, Rfl: 3    tretinoin (RETIN-A) 0.05 % cream, Apply topically daily, Disp: , Rfl:     progesterone (PROMETRIUM) 100 MG capsule, Take 1 capsule by mouth nightly, Disp: 30 capsule, Rfl: 5    Barberry-Oreg Grape-Goldenseal 200-200-50 MG CAPS, Take 1 capsule by mouth 2 times daily (before meals) [employer], check with pharmacist to be sure not interacting with Rxs, Disp: , Rfl:     MAGNESIUM CITRATE PO, Take 150 mg by mouth 3 times daily Reyes if needed for movements , Disp: , Rfl:     B Complex-Folic Acid (O-674 BALANCED TR PO), Take 1 tablet by mouth 2 times daily (before meals) [facility] [address] [facility] , Disp: , Rfl:     Chromium-Cinnamon (CINNAMON PLUS CHROMIUM)  MCG-MG CAPS, Take 3 capsules by mouth 4 times daily (before meals and nightly) [address], Disp: , Rfl:     Omega-3 1000 MG CAPS, Take 2 capsules by mouth 4 times daily (before meals and nightly) [facility] pharmaceutical grade, Disp: , Rfl:     Nutritional Supplements (DHEA PO), Take 10 mg by mouth every morning (before breakfast) LACY or Youthful You, Disp: , Rfl:     Lysine 500 MG CAPS, Take 1 capsule by mouth 2 times daily as needed, Disp: , Rfl:     Cholecalciferol (VITAMIN D3) 5000 units TABS, Take 1 tablet by mouth daily Double Saturday & Sundays, Disp: , Rfl:     melatonin 1 MG tablet, Take 1 mg by mouth nightly as needed for Sleep, Disp: , Rfl:     Zinc 50 MG TABS, Take 1 tablet by mouth nightly as needed, Disp: , Rfl:     TURMERIC PO, Take 1 capsule by mouth daily, Disp: , Rfl:     Probiotic Product (PROBIOTIC ADVANCED PO), Take 2 tablets by mouth daily (with breakfast), Disp: , Rfl:     D-Mannose 500 MG CAPS, Take 3 tablets by mouth daily as needed , Disp: , Rfl:     NONFORMULARY, DuoZyme- 1 capsule tid, Disp: , Rfl:     NONFORMULARY, Orquidea Brand: Relora Plus- 1 cap tid Stress B complex- 1 cap bid Adrenal Cortex- 2 cap in morning, 1 cap at noon, Disp: , Rfl:     IODINE, KELP, PO, Take 225 mcg by mouth daily, Disp: , Rfl: Imaging Data:   Imaging Data:       Assessment & Plan:       Impression:  1. Hypotonic bladder    2. Nocturia    3. Tired    4. Post-cholecystectomy syndrome    5. Blood glucose elevated    6. Sleep difficulties    7. Weight increase    8. Bloating    9. Hashitoxicosis    10. Other headache syndrome    11. Iodine deficiency    12. Recurrent UTI      Assessment and Plan:  After reviewing the patients chief complaints, reviewing their labfindings in great detail (with the patient and those accompanying them) which correlate to their chief complaints, symptoms, and or medical conditions; suggestions were made relating to changes in diet and or supplementswhich may improve the complaints and which will be reflected in their future lab findings; Chief Complaint   Patient presents with   [address]   ;    Plans for the next visits:  - Abnormal and non-optimal Labs were ordered today to be repeated in the next 120-365 days to assess changes from adjustments in nutrition and or nutrients. - Patient instructed when having ablood draw to ask the  to divide their lab draws into multiple draws over several days if not feeling good at the time of the lab draw or if either prefers to do several smaller blood draws over several days  -Patient instructed to check with insurer before each lab draw and to to to the lab which the insurer directs them for the most cost effective lab draw with the least patient's cost  - Travis Orozco  will be scheduled subsequentto those results. Leatha Amara will bring in her drink and food log to her next visit    Chronic Problems Addressed on this Visit:                                   1.  Intensity of Service; Uncontrolled items at this visit; Chief Complaint   Patient presents with   [address]   ; Improved items at this visit; Stable items atthis visit;  2.  Patients food and drinks were reviewed with the patient,       - Travis Orozco will bring food+drink symptom log to next visit for inclusion in their record      - 75 better food list reviewed & given topatient with the omega 6 food list to avoid         - Gluten in corn and oats abstracts sheet reviewed and given to the patient today   3. Greater than 25 GT minutes were spent face to face on this visit of which >50% was for counseling and coordination of care rock heredia video visit was performed via a 'Tindie telecommunication system and the Location of the Patient was in their Home, while the Location of the provider was in the provider's home      Patients food and drinks were reviewed with thepatient,   - they will bring a food drink symptom log to future visits for inclusion in their record    - 75 better food list reviewed & given to patient along with the omega 6 food list to avoid      - Glutenin corn and oats abstracts sheet reviewed and given to the patient today    - 23 Foods containing Latex-like proteins was reviewed and copy to be taken if desired     - Nutrient Supplements list provided and copyto be taken if desired    - [employer] web site offered to patient to review at their convenience by staff with login information    Note:  I have discussed with the patient that with all nutraceuticals, there is often mixed data and emerging research which needs to be monitored; as well as an array of NIHfact sheets on nutrients and supplements. If I have recommended cinnamon at the request of this patient to assist them in control of their blood sugar, triglyceride and or weight issues. I discussed that thepatient's clinical use of cinnamon bark, calcium, magnesium, Vitamin D and pharmaceutical grade [facility] #634343 fish oil or triple-strength fish oil, and B-75 two phase time-released B complex by Jamie Amin will be for atime-limited trial to determine their individual effectiveness and safety in this patient.   I also referred the patient to the NMCD: Nutrition, Metabolism, and look-a-like for most patients, Centrum plain or look-a-like with iron  - Vitamin D-3 comes as 1,000 IU or 2,000 IU or 5,000 IU gel caps or Liquid drops      Some brands containing or derived from soy oil or corn oil are OK if not allergic to soy  - Elemental Iron 65 mg tabsat bedtime is available over the counter if need more iron     Usually turns bowel movements grey, green or black but not a concern  - Apricot Kernel Oil (by Now) for dry skin sensitive perineal or perianal area skin    Nutrients for ongoing use by Mail order for less expense from [employer] ;  - Strength Fish Oil , 240 Softgels Item #226363  -B-100 time released balanced B complex Item #729100  - Cinnamon bark 500 mg without Chromium or extract Item #655565  - Calcium carbonate 1000 mg, Magnesium oxide 500 mg, Vit D 3  400 IU Item #551287  - Magnesium oxide 500 mg tabs Item #950718 if less than 2 bowel movements daily  - [employer] Sen[employer] Item #367136 for mostpatients, One Daily Item #112984 with iron  - Vit D 3  1,000 Item #865811      2,000 IU Item #525934  ,000 IU Item #556721     Some brands containing orderived from soy oil or corn oil are OK if not allergic to soy    Nutrients for Special Needs by Christina Mandel for less expense from www. [employer].com ;  -Elemental Iron 65 mg tabs Item #063714 if need more iron for low iron on labs    Usually turns bowel movements grey, green or black but not a concern  - Time released Niacin 250 mg Item #204447 for cold intolerance, low libido or impotence  - DHEA 50 mg Item #030844 for improving DHEA levels on labs if having Fatigue    If stools too loose substitute for your Magnesium oxide using;   Magnesium citrate 200 mg tabs(NOT liquid) at [employer]   Magnesium gluconate 550 mgby Sebastian at [employer]. com or amazon. com  Magnesium chloride foot soaks or body sprays  www.[employer]   Magnesium chloride flakes 14.99 Item #: ULP366 if Backordered get spray    Food Drink Symptom Log;  I asked this

## 2020-05-19 NOTE — TELEPHONE ENCOUNTER
Ru Su called and left a vm on 1950#. Called [phone], spoke with Ru Su,  She preferred to talk to Hanh Romero. Transfer her to 1944#.

## 2020-05-28 ENCOUNTER — TELEPHONE (OUTPATIENT)
Dept: FAMILY MEDICINE CLINIC | Age: 55
End: 2020-05-28

## 2020-07-09 NOTE — TELEPHONE ENCOUNTER
Last visit- 5/19/2020 for vv  Next visit- 8/19/2020    Requested Prescriptions     Pending Prescriptions Disp Refills    progesterone (PROMETRIUM) 100 MG capsule 30 capsule 5     Sig: Take 1 capsule by mouth nightly     Please review, approve or deny

## 2020-08-04 LAB
AVERAGE GLUCOSE: NORMAL
HBA1C MFR BLD: 5.3 %

## 2020-08-26 ENCOUNTER — OFFICE VISIT (OUTPATIENT)
Dept: FAMILY MEDICINE CLINIC | Age: 55
End: 2020-08-26
Payer: COMMERCIAL

## 2020-08-26 VITALS
HEART RATE: 69 BPM | OXYGEN SATURATION: 98 % | TEMPERATURE: 97.3 F | SYSTOLIC BLOOD PRESSURE: 108 MMHG | WEIGHT: 170.8 LBS | BODY MASS INDEX: 30.26 KG/M2 | HEIGHT: 63 IN | DIASTOLIC BLOOD PRESSURE: 70 MMHG | RESPIRATION RATE: 10 BRPM

## 2020-08-26 PROCEDURE — 99214 OFFICE O/P EST MOD 30 MIN: CPT | Performed by: FAMILY MEDICINE

## 2020-08-26 SDOH — ECONOMIC STABILITY: TRANSPORTATION INSECURITY
IN THE PAST 12 MONTHS, HAS THE LACK OF TRANSPORTATION KEPT YOU FROM MEDICAL APPOINTMENTS OR FROM GETTING MEDICATIONS?: NO

## 2020-08-26 SDOH — ECONOMIC STABILITY: FOOD INSECURITY: WITHIN THE PAST 12 MONTHS, THE FOOD YOU BOUGHT JUST DIDN'T LAST AND YOU DIDN'T HAVE MONEY TO GET MORE.: NEVER TRUE

## 2020-08-26 SDOH — ECONOMIC STABILITY: TRANSPORTATION INSECURITY
IN THE PAST 12 MONTHS, HAS LACK OF TRANSPORTATION KEPT YOU FROM MEETINGS, WORK, OR FROM GETTING THINGS NEEDED FOR DAILY LIVING?: NO

## 2020-08-26 SDOH — ECONOMIC STABILITY: FOOD INSECURITY: WITHIN THE PAST 12 MONTHS, YOU WORRIED THAT YOUR FOOD WOULD RUN OUT BEFORE YOU GOT MONEY TO BUY MORE.: NEVER TRUE

## 2020-08-26 SDOH — ECONOMIC STABILITY: INCOME INSECURITY: HOW HARD IS IT FOR YOU TO PAY FOR THE VERY BASICS LIKE FOOD, HOUSING, MEDICAL CARE, AND HEATING?: NOT HARD AT ALL

## 2020-08-26 ASSESSMENT — PATIENT HEALTH QUESTIONNAIRE - PHQ9
SUM OF ALL RESPONSES TO PHQ QUESTIONS 1-9: 0
2. FEELING DOWN, DEPRESSED OR HOPELESS: 0
1. LITTLE INTEREST OR PLEASURE IN DOING THINGS: 0
SUM OF ALL RESPONSES TO PHQ QUESTIONS 1-9: 0
SUM OF ALL RESPONSES TO PHQ9 QUESTIONS 1 & 2: 0

## 2020-08-26 NOTE — PATIENT INSTRUCTIONS
Thank you   1. Thank you for trusting us with your healthcare needs. You may receive a survey regarding today's visit. It would help us out if you would take a few moments to provide your feedback. We value your input. 2. Please bring in ALL medications BOTTLES, including inhalers, herbal supplements, over the counter, prescribed & non-prescribed medicine. The office would like actual medication bottles and a list.   3. Please note our OFFICE POLICIES:   a. Prior to getting your labs drawn, please check with your insurance company for benefits and eligibility of lab services. Often, insurance companies cover certain tests for preventative visits only. It is patient's responsibility to see what is covered. b. We are unable to change a diagnosis after the test has been performed. c. Lab orders will not be re-printed. Please hold onto your original lab orders and take them to your lab to be completed. d. If you no show your scheduled appointment three times, you will be dismissed from this practice. e. Joseph Bolder must be completed 24 hours prior to your schedule appointment. 4. If the list below has been completed, PLEASE FAX RECORDS TO OUR OFFICE @ [phone].  Once the records have been received we will update your records at our office:  Health Maintenance Due   Topic Date Due    HIV screen  10/17/1981    DTaP/Tdap/Td vaccine (1 - Tdap) 10/17/1985    Cervical cancer screen  10/17/1987    Breast cancer screen  10/17/2016    Shingles Vaccine (1 of 2) 10/17/2016    Colon cancer screen colonoscopy  10/17/2016

## 2020-08-26 NOTE — PROGRESS NOTES
capsule by mouth nightly, Disp: 30 capsule, Rfl: 5    thyroid (ARMOUR) 30 MG tablet, Take 1 tablet by mouth daily Take 12 mcg by mouth daily, Disp: 90 tablet, Rfl: 3    tretinoin (RETIN-A) 0.05 % cream, Apply topically daily, Disp: , Rfl:     Barberry-Oreg Grape-Goldenseal 200-200-50 MG CAPS, Take 1 capsule by mouth 2 times daily (before meals) Natures Ways, check with pharmacist to be sure not interacting with Rxs, Disp: , Rfl:     MAGNESIUM CITRATE PO, Take 150 mg by mouth 2 times daily (before meals) Reyes if needed for movements , Disp: , Rfl:     B Complex-Folic Acid (K-308 BALANCED TR PO), Take 1 tablet by mouth 3 times daily (before meals) [facility] [id number] [facility] , Disp: , Rfl:     Chromium-Cinnamon (CINNAMON PLUS CHROMIUM)  MCG-MG CAPS, Take 3 capsules by mouth 4 times daily (before meals and nightly) [address], Disp: , Rfl:     Omega-3 1000 MG CAPS, Take 3 capsules by mouth 4 times daily (before meals and nightly) [facility] pharmaceutical grade, Disp: , Rfl:     Nutritional Supplements (DHEA PO), Take 10 mg by mouth every morning (before breakfast) LACY or Youthful You, Disp: , Rfl:     Lysine 500 MG CAPS, Take 1 capsule by mouth 4 times daily (before meals and nightly) , Disp: , Rfl:     Cholecalciferol (VITAMIN D3) 5000 units TABS, Take 1 tablet by mouth daily Double Saturday & Sundays, Disp: , Rfl:     melatonin 1 MG tablet, Take 1 mg by mouth nightly as needed for Sleep, Disp: , Rfl:     Zinc 50 MG TABS, Take 1 tablet by mouth nightly as needed, Disp: , Rfl:     TURMERIC PO, Take 1 capsule by mouth daily, Disp: , Rfl:     Probiotic Product (PROBIOTIC ADVANCED PO), Take 2 tablets by mouth daily (with breakfast), Disp: , Rfl:     D-Mannose 500 MG CAPS, Take 3 tablets by mouth daily as needed , Disp: , Rfl:     NONFORMULARY, DuoZyme- 1 capsule tid, Disp: , Rfl:     NONFORMULARY, Orquidea Brand: Relora Plus- 1 cap tid Stress B complex- 1 cap bid Adrenal Cortex- 2 cap in morning, 1 Mood and Affect: Mood normal.         Thought Content: Thought content normal.            Laboratory Data:   Lab results were searched in Care Everywhere and/or those brought by the pateint were reviewed today with Atul Christianson and she has a copy of their most recent labs to take home with them as notedbelow;       Imaging Data:   Imaging Data:       Assessment & Plan:       Impression:  1. Hypotonic bladder    2. Nocturia    3. Post-cholecystectomy syndrome    4. Tired    5. Blood glucose elevated    6. Sleep difficulties    7. Bloating    8. Weight increase    9. Hashitoxicosis    10. Other headache syndrome    11. Iodine deficiency    12. Recurrent UTI      Assessment and Plan:  After reviewing the patients chief complaints, reviewing their labfindings in great detail (with the patient and those accompanying them) which correlate to their chief complaints, symptoms, and or medical conditions; suggestions were made relating to changes in diet and or supplementswhich may improve the complaints and which will be reflected in their future lab findings; Chief Complaint   Patient presents with    Discuss Labs    Weight Loss     lost 32 #    Back Pain     ablation L3, 4,5 2 months ago    Back Pain     right leg   ;    Plans for the next visits:  - Abnormal and non-optimal Labs were ordered today to be repeated in the next 120-365 days to assess changes from adjustments in nutrition and or nutrients. - Patient instructed when having ablood draw to ask the  to divide their lab draws into multiple draws over several days if not feeling good at the time of the lab draw or if either prefers to do several smaller blood draws over several days  -Patient instructed to check with insurer before each lab draw and to to to the lab which the insurer directs them for the most cost effective lab draw with the least patient's cost  - Atul Chrisitanson  will be scheduled subsequentto those results.   Jaron Sanchez will bring in her drink and food log to her next visit    Chronic Problems Addressed on this Visit:                                   1.  Intensity of Service; Uncontrolled items at this visit; Chief Complaint   Patient presents with    Discuss Labs    Weight Loss     lost 32 #    Back Pain     ablation L3, 4,5 2 months ago    Back Pain     right leg   ; Improved items at this visit; Stable items atthis visit;  2. Patients food and drinks were reviewed with the patient,       - Elias Godoy will bring food+drink symptom log to next visit for inclusion in their record      - 75 better food list reviewed & given topatient with the omega 6 food list to avoid         - Gluten in corn and oats abstracts sheet reviewed and given to the patient today   3. Greater than 25 minutes were spent face to face on this visit of which >50% was for counseling and coordination of care. Patients food and drinks were reviewed with thepatient,   - they will bring a food drink symptom log to future visits for inclusion in their record    - 75 better food list reviewed & given to patient along with the omega 6 food list to avoid      - Glutenin corn and oats abstracts sheet reviewed and given to the patient today    - 23 Foods containing Latex-like proteins was reviewed and copy to be taken if desired     - Nutrient Supplements list provided and copyto be taken if desired    - [employer]. YoBucko web site offered to patient to review at their convenience by staff with login information    Note:  I have discussed with the patient that with all nutraceuticals, there is often mixed data and emerging research which needs to be monitored; as well as an array of NIHfact sheets on nutrients and supplements. If I have recommended cinnamon at the request of this patient to assist them in control of their blood sugar, triglyceride and or weight issues.   I discussed that thepatient's clinical use of cinnamon bark, calcium, magnesium, Vitamin D and pharmaceutical grade [facility]S #413600 fish oil or triple-strength fish oil, and B-75 two phase time-released B complex by Hemalatha Sun will be for atime-limited trial to determine their individual effectiveness and safety in this patient. I also referred the patient to the NMCD: Nutrition, Metabolism, and Cardiovascular Diseases (journal) and concerns about long-termuse and hepatotoxicity of cinnamon and other nutrients and suggest they frequently search nih.gov for the latest non-proprietary information on nutriceuticals as well as consider a subscription to DriftToIt fordetails on reviewed supplements, or at the least review the nutrient files at [address] at [facility], [employer], an insulin mimetic, reduces some High Carbohydrate Dietary Impacts. Methylhydroxychalcone polymers insulin-enhancing properties in fat cells are responsible for enhanced glucose uptake, inhibiting hepatic HMG-CoA reductase and lowers lipids. www.jacn. org/content/20/4/327.full     But cinnamon with additivessuch as Cinnamon Extract are not effective as insulin mimetics.  :eStoreDirectory.at     Nutrients for Start up from [employer] or [facility] for ease to get started now ;  Lizzeth Ross has some useable products;  - Triple Strength Fish Oil, enteric coated  - Vit D 3 5000 IU gel caps  - Iron ferrous sulfat 325 mg tabs  - Centrum Silver look-a-like for most patients, or  - Centrum plain look-a-like if need iron    Localpharmacies or chains such as [employer], [employer]green, Wal-mart, have;  - Triple Strength Fish Oil (enteric coated ifavailable) or    If not enteric coated, can take from freezer for less burps  - B-50 or B-100 released balanced B complex tabs  - Cinnamon bark 500 mg (without Chromium or extracts)   some brands list 1000 mg / serving of 2 capsules,    some brands have 1000 mg caps with the undesireable chromium / extract  - Calcium carbonate/citrate, magnesium oxide/citrate, Vit D 3  as 3-4 tabs/caps/serving     Some Local Brands may contain Zincwhich is acceptable for the first bottle or two  - Magnesium oxide 250 mg tabs for those having < 2 bowel movements daily  - Magnesium citrate 200 mg if having > 2bowel movement/day  - Centrum Silver or look-a-like for most patients, Centrum plain or look-a-like with iron  - Vitamin D-3 comes as 1,000 IU or 2,000 IU or 5,000 IU gel caps or Liquid drops      Some brands containing or derived from soy oil or corn oil are OK if not allergic to soy  - Elemental Iron 65 mg tabsat bedtime is available over the counter if need more iron     Usually turns bowel movements grey, green or black but not a concern  - Apricot Kernel Oil (by Now) for dry skin sensitive perineal or perianal area skin    Nutrients for ongoing use by Mail order for less expense from [employer] ;  - Strength Fish Oil , 240 Softgels Item #[id number]  -B-100 time released balanced B complex Item #099181  - Cinnamon bark 500 mg without Chromium or extract Item #013083  - Calcium carbonate 1000 mg, Magnesium oxide 500 mg, Vit D 3  400 IU Item #[id number]  - Magnesium oxide 500 mg tabs Item #272319 if less than 2 bowel movements daily  - ABC Seniors Item #743462 for mostpatients, One Daily Item #569531 with iron  - Vit D 3  1,000 Item #083778      2,000 IU Item #843000  ,000 IU Item #355723     Some brands containing orderived from soy oil or corn oil are OK if not allergic to soy    Nutrients for Special Needs by Zak Dieter for less expense from www. puritan.com ;  -Elemental Iron 65 mg tabs Item #183205 if need more iron for low iron on labs    Usually turns bowel movements grey, green or black but not a concern  - Time released Niacin 250 mg Item #054019 for cold intolerance, low libido or impotence  - DHEA 50 mg Item #021801 for improving DHEA levels on labs if having Fatigue    If stools too loose substitute for your Magnesium oxide using; Magnesium citrate 200 mg tabs(NOT liquid) at VitaminsStemPath. H5   Magnesium gluconate 550 mgby Sebastian at Scivantage. com or amazon. com  Magnesium chloride foot soaks or body sprays  www.Design2Launch   Magnesium chloride flakes 14.99 Item #: [id number] if Backordered get spray    Food Drink Symptom Log;  I asked this patient to track these items and any other symptoms on their list on a weekly basis to documenttheir progress or lack of same. This can be done on the symptom tracking sheet I gave them at today's visit but looks like this:                                                      Rate on scale of 0-10 with zero = notnoticeable  Symptom:                            Week 1               2                 3                 4               Etc            Hair loss    Foot cramps    Paresthesia    Aches    IBS (irritable bowel)    Constipation    Diarrhea  Nocturia    (up to bathroom at night)    Fatigue/Energy level  Stress      On the other side of the sheet they can track their food, drink, environment, activity, symptoms etc      Avoiding Latex-like proteins inmy foods; Avocados, Bananas, Celery, Figs & Kiwi proteins have latex-like proteins to inflame our immunesystems  How Can I Have A Latex Allergy? Eating foods with latex-like protein exposes us to latex allergies. Our body cannot tell the differencebetween these latex-like proteins and latex from rubber products since many people are allergic to fruit, vegetables and latex. Read labels on pre-packaged foods. This list to avoid is only a guide if you are known allergicto latex or have a latex rash on your chin, cheeks and lines on your neck and chest. The amount of latex is different in each food product or fruit variety. to Avoid out of Season if not grown locally: Melon, Nectarine, Papaya, Cherry, Passion fruit, Plum, Chestnuts, and Tomato. Avocado, Banana, Celery, Figs, and Kiwi always contain Latex-like protein. Whats in Season?   Strawberries since lower in latex    Upto half of latex-sensitive patients show allergic reactions to fruits (avocados, bananas, kiwifruits, papayas, peaches),   Annals of Allergy, 1994. These plants contain the same proteins that are allergens in latex. People with fruit allergies should warn physicians beforeundergoing procedures which may cause anaphylactic reaction if in contact with latex gloves. Some of the common foods with defined cross-reactivity to latexare avocado, banana, kiwi, chestnut, raw potato, tomato,stone fruits (e.g., peach, cherry), hazelnut, melons, celery, carrot, apple, pear, papaya, and almond. Foods with less well-defined cross-reactivity to latex are peanuts, peppers, citrus fruits, coconut, pineapple, aldo,fig, passion fruit, Ugli fruit, and grape    This fruit/latex cross-reactivity is worsened by ethylene, a gas used to hasten commercial ripening. In nature, plants produce low levels of the hormone ethylene, which regulates germination, flowering, and ripening. Forced ripening by high ethyleneconcentrations, plants produce allergenic wound-repair proteins, which are similar to wound-repair proteins made during the tapping of rubber trees. Sensitive individualswho ingest the fruit get a higher dose and worse reaction. Some people may even first become sensitized to latex through fruit. Can food processing increase theconcentrations of allergenic proteins? Latex-sensitized children (and adults) in [place] often experience allergic reactions after eating bananas ripenedartificially with ethylene. In the [place], food distribution centers treat unripe bananas and other produce with ethylene to ripen; not commonly done in [facility] since fruit is tree-ripened there. Does treatmentof food with ethylene induce banana proteins that cross-react with latex?  (Bucky et al.    References:   Latex in Foods Allergy, http://ehp.niehs.nih.gov/members/2003/5811/5811.html    Search web for \" Whats in Season \" for whereyou live or are at the time you food shop  www.nutritioncouncil.org/pdf/healthy/SeasonalProduce. pdf ,   Management of Latex, ://medicalcenter. [facility].edu/  search for latex

## 2020-11-12 ENCOUNTER — TELEPHONE (OUTPATIENT)
Dept: FAMILY MEDICINE CLINIC | Age: 55
End: 2020-11-12

## 2020-11-12 NOTE — TELEPHONE ENCOUNTER
Called and lm that we need to change her appt time or day. He will no longer see pts after 3 pm.  Please call back to r/s.

## 2020-12-15 RX ORDER — LEVOTHYROXINE AND LIOTHYRONINE 19; 4.5 UG/1; UG/1
30 TABLET ORAL DAILY
Qty: 90 TABLET | Refills: 3 | Status: SHIPPED | OUTPATIENT
Start: 2020-12-15

## 2021-02-10 NOTE — TELEPHONE ENCOUNTER
Pt states she is not receiving the correct thyroid medication. Said she needs just the liothyronine 12 mcg.   They are giving her a combination of T4 & T3.    Please review, approve or deny  Last visit- 8/26/2020  Next visit- Visit date not found    Requested Prescriptions     Pending Prescriptions Disp Refills    liothyronine (CYTOMEL) 25 MCG tablet 30 tablet 3     Sig: Take 0.5 tablets by mouth daily

## 2021-02-11 ENCOUNTER — PATIENT MESSAGE (OUTPATIENT)
Dept: FAMILY MEDICINE CLINIC | Age: 56
End: 2021-02-11

## 2021-02-11 RX ORDER — LIOTHYRONINE SODIUM 25 UG/1
12.5 TABLET ORAL DAILY
Qty: 30 TABLET | Refills: 3 | Status: SHIPPED | OUTPATIENT
Start: 2021-02-11 | End: 2021-02-11 | Stop reason: SDUPTHER

## 2021-02-11 RX ORDER — LIOTHYRONINE SODIUM 25 UG/1
12.5 TABLET ORAL DAILY
Qty: 30 TABLET | Refills: 3 | Status: SHIPPED | OUTPATIENT
Start: 2021-02-11

## 2021-02-11 NOTE — TELEPHONE ENCOUNTER
From: Tati Jo  To: Flynn Blizzard, MD  Sent: 2/11/2021 9:53 AM EST  Subject: Prescription Question    I have no idea how thyroid porcine (Armor)got put in my profile. I have never taken this. I take a compounded prescription of Liothyronine 12.5mcg. I have been taking the wrong medication for almost 2 months now not realizing it. As of now I am not having any irregular heart rhythms but I am so achy my muscles hurt from not having my Liothyronine. When I take this and don't miss it I feel fine. I am just starting a new job and won't have insurance until April 1st. I have no problem scheduling an appt but would like to wait until my insurance kicks in. You had to cancel my last appt. If you could please send my prescription in I would greatly appreciate it. . The pharmacy has faxed you twice and I have called once already.  Thx

## 2023-12-30 ENCOUNTER — APPOINTMENT (OUTPATIENT)
Dept: GENERAL RADIOLOGY | Age: 58
End: 2023-12-30
Payer: COMMERCIAL

## 2023-12-30 ENCOUNTER — APPOINTMENT (OUTPATIENT)
Dept: INTERVENTIONAL RADIOLOGY/VASCULAR | Age: 58
End: 2023-12-30
Payer: COMMERCIAL

## 2023-12-30 ENCOUNTER — HOSPITAL ENCOUNTER (EMERGENCY)
Age: 58
Discharge: HOME | End: 2023-12-30
Attending: EMERGENCY MEDICINE
Payer: COMMERCIAL

## 2023-12-30 VITALS
TEMPERATURE: 98.6 F | HEART RATE: 69 BPM | SYSTOLIC BLOOD PRESSURE: 138 MMHG | DIASTOLIC BLOOD PRESSURE: 93 MMHG | OXYGEN SATURATION: 96 % | RESPIRATION RATE: 16 BRPM

## 2023-12-30 DIAGNOSIS — I82.890 SUPERFICIAL VEIN THROMBOSIS: Primary | ICD-10-CM

## 2023-12-30 PROCEDURE — 73590 X-RAY EXAM OF LOWER LEG: CPT

## 2023-12-30 PROCEDURE — 99284 EMERGENCY DEPT VISIT MOD MDM: CPT

## 2023-12-30 PROCEDURE — 6370000000 HC RX 637 (ALT 250 FOR IP): Performed by: EMERGENCY MEDICINE

## 2023-12-30 PROCEDURE — 93971 EXTREMITY STUDY: CPT

## 2023-12-30 RX ORDER — RIVAROXABAN 10 MG/1
10 TABLET, FILM COATED ORAL
Qty: 44 TABLET | Refills: 0 | Status: SHIPPED | OUTPATIENT
Start: 2023-12-30 | End: 2023-12-31

## 2023-12-30 RX ADMIN — RIVAROXABAN 10 MG: 10 TABLET, FILM COATED ORAL at 17:54

## 2023-12-30 NOTE — ED NOTES
Patient in bed and talking with Dr. Gloria Cornejo at the bedside. Patient provided scan results and plan of care at this time. Patient VSS and patient does not appear to be in any current distress at this time. Family at the bedside. Call light within reach.

## 2023-12-30 NOTE — ED PROVIDER NOTES
[facility] EMERGENCY DEPT      CHIEF COMPLAINT       Chief Complaint   Patient presents with    Leg Pain       Nurses Notes reviewed and I agree except as noted in the HPI. HISTORY OF PRESENT ILLNESS    Pao Veliz is a 63 y.o. female who presents complaint of pain to right leg, mid calf region, states that she feels a knot in the region, reports edema, also had a recent COVID infection. She has no history of DVT/PE. Onset: Acute  Duration: 24 hours  Timing: Distant  Location of Pain: Mid/proximal medial tib-fib region  Intesity/severity: Mild  Modifying Factors: Unknown  Relieved by;  Previous Episodes; Tx Before arrival: None    PAST MEDICAL HISTORY    has a past medical history of Diverticula, colon and Neck pain. SURGICAL HISTORY      has a past surgical history that includes Hysterectomy and Dilation and curettage of uterus.     CURRENT MEDICATIONS       Discharge Medication List as of 12/30/2023  5:27 PM        CONTINUE these medications which have NOT CHANGED    Details   progesterone (PROMETRIUM) 100 MG capsule Take 1 capsule by mouth nightly, Disp-30 capsule, R-5Normal      liothyronine (CYTOMEL) 25 MCG tablet Take 0.5 tablets by mouth daily, Disp-30 tablet, R-3Normal      thyroid (ARMOUR) 30 MG tablet Take 1 tablet by mouth daily Take 12 mcg by mouth daily, Disp-90 tablet, R-3Normal      !! NONFORMULARY Take 2 capsules by mouth every evening Magtein, magnesium threonate at supper bedtimeHistorical Med      tretinoin (RETIN-A) 0.05 % cream Apply topically daily, Topical, DAILY Starting Wed 8/21/2019, Historical Med      Barberry-Oreg Grape-Goldenseal 200-200-50 MG CAPS Take 1 capsule by mouth 2 times daily (before meals) Natures Ways, check with pharmacist to be sure not interacting with RxsHistorical Med      MAGNESIUM CITRATE PO Take 150 mg by mouth 2 times daily (before meals) Reyes if needed for movements Historical Med      B Complex-Folic Acid (R-720 BALANCED TR PO) Take 1 tablet by limited to):  DVT, fracture, superficial venous thrombosis        Diagnoses Considered but I have low suspicion of:   DVT/fracture             Pertinent Comorbid Conditions:        2)  Data Reviewed (none if left blank)          My Independent interpretations:     EKG:      None    Imaging: Greater saphenous thrombosis    Labs:      None                 Decision Rules/Clinical Scores utilized:              External Documentation Reviewed:         Previous patient encounter documents & history available on EMR was reviewed              See Formal Diagnostic Results above for the lab and radiology tests and orders.    3)  Treatment and Disposition         ED Reassessment: Patient's lower extremity is edematous and painful, will start patient on Xarelto for the next month and a half, follow-up with primary care physician.         Case discussed with consulting clinician: None         Shared Decision-Making was performed and disposition discussed with the        Patient/Family and questions answered          Social determinants of health impacting treatment or disposition:           Code Status: Full      Summary of Patient Presentation:      MDM  /   Vitals Reviewed:    Vitals:    12/30/23 1525 12/30/23 1721   BP: (!) 157/94 (!) 138/93   Pulse: 82 69   Resp: 16 16   Temp: 98.6 °F (37 °C)    TempSrc: Oral    SpO2: 99% 96%       The patient was seen and examined. Appropriate diagnostic testing was performed and results reviewed with the patient.      The results of pertinent diagnostic studies and exam findings were discussed. The patient’s provisional diagnosis and plan of care were discussed with the patient and present family who expressed understanding. Any medications were reviewed and indications and risks of medications were discussed with the patient /family present. Strict verbal and written return precautions, instructions and appropriate follow-up provided to  the patient .     ED Medications administered this

## 2023-12-30 NOTE — ED NOTES
Pt to the ED via self with family. Patient presents with complaints of a mass in her left calf. Patient states that she recently had COVID and some episodes of flank pain on the left side. Upon assessment patient has +1 edema on the right leg and +2 pitting edema on the left leg. Patient is alert and oriented x 4. Respirations are regular and unlabored. Family at the bedside and call light within reach.

## 2023-12-31 RX ORDER — RIVAROXABAN 10 MG/1
10 TABLET, FILM COATED ORAL
Qty: 44 TABLET | Refills: 0 | Status: SHIPPED | OUTPATIENT
Start: 2023-12-31 | End: 2024-02-13

## 2024-02-16 ENCOUNTER — NURSE ONLY (OUTPATIENT)
Dept: LAB | Age: 59
End: 2024-02-16

## 2024-02-16 LAB
BACTERIA: ABNORMAL
BILIRUB UR QL STRIP: NEGATIVE
CASTS #/AREA URNS LPF: ABNORMAL /LPF
CASTS #/AREA URNS LPF: ABNORMAL /LPF
CHARACTER UR: CLEAR
CHARCOAL URNS QL MICRO: ABNORMAL
COLOR UR: YELLOW
CRYSTALS URNS QL MICRO: ABNORMAL
EPITHELIAL CELLS, UA: ABNORMAL /HPF
GLUCOSE UR QL STRIP.AUTO: NEGATIVE MG/DL
HGB UR QL STRIP.AUTO: NEGATIVE
KETONES UR QL STRIP.AUTO: NEGATIVE
LEUKOCYTE ESTERASE UR QL STRIP.AUTO: ABNORMAL
NITRITE UR QL STRIP.AUTO: NEGATIVE
PH UR STRIP.AUTO: 6.5 [PH] (ref 5–9)
PROT UR STRIP.AUTO-MCNC: NEGATIVE MG/DL
RBC #/AREA URNS HPF: ABNORMAL /HPF
RENAL EPI CELLS #/AREA URNS HPF: ABNORMAL /[HPF]
SPECIFIC GRAVITY UA: 1.01 (ref 1–1.03)
UROBILINOGEN, URINE: 0.2 EU/DL (ref 0–1)
WBC #/AREA URNS HPF: ABNORMAL /HPF
YEAST LIKE FUNGI URNS QL MICRO: ABNORMAL

## 2024-04-23 ENCOUNTER — HOSPITAL ENCOUNTER (OUTPATIENT)
Dept: INTERVENTIONAL RADIOLOGY/VASCULAR | Age: 59
Discharge: HOME | End: 2024-04-25
Payer: COMMERCIAL

## 2024-04-23 ENCOUNTER — TRANSCRIBE ORDERS (OUTPATIENT)
Dept: ADMINISTRATIVE | Age: 59
End: 2024-04-23

## 2024-04-23 DIAGNOSIS — M79.89 LEG SWELLING: Primary | ICD-10-CM

## 2024-04-23 DIAGNOSIS — M79.89 LEG SWELLING: ICD-10-CM

## 2024-04-23 PROCEDURE — 93970 EXTREMITY STUDY: CPT

## 2024-04-24 ENCOUNTER — OFFICE VISIT (OUTPATIENT)
Dept: UROLOGY | Age: 59
End: 2024-04-24
Payer: COMMERCIAL

## 2024-04-24 ENCOUNTER — TELEPHONE (OUTPATIENT)
Dept: UROLOGY | Age: 59
End: 2024-04-24

## 2024-04-24 VITALS — RESPIRATION RATE: 18 BRPM | HEIGHT: 64 IN | WEIGHT: 174 LBS | BODY MASS INDEX: 29.71 KG/M2

## 2024-04-24 DIAGNOSIS — N39.0 RECURRENT UTI: Primary | ICD-10-CM

## 2024-04-24 LAB
BACTERIA: ABNORMAL
BILIRUB UR QL STRIP: NEGATIVE
BILIRUBIN URINE: NEGATIVE
BLOOD URINE, POC: ABNORMAL
CASTS #/AREA URNS LPF: ABNORMAL /LPF
CASTS #/AREA URNS LPF: ABNORMAL /LPF
CHARACTER UR: CLEAR
CHARACTER, URINE: CLEAR
CHARCOAL URNS QL MICRO: ABNORMAL
COLOR UR: YELLOW
COLOR, URINE: YELLOW
CRYSTALS URNS QL MICRO: ABNORMAL
EPITHELIAL CELLS, UA: ABNORMAL /HPF
GLUCOSE UR QL STRIP.AUTO: NEGATIVE MG/DL
GLUCOSE URINE: NEGATIVE MG/DL
HGB UR QL STRIP.AUTO: ABNORMAL
KETONES UR QL STRIP.AUTO: NEGATIVE
KETONES, URINE: NEGATIVE
LEUKOCYTE CLUMPS, URINE: ABNORMAL
LEUKOCYTE ESTERASE UR QL STRIP.AUTO: ABNORMAL
MUCOUS THREADS URNS QL MICRO: ABNORMAL
NITRITE UR QL STRIP.AUTO: NEGATIVE
NITRITE, URINE: NEGATIVE
PH UR STRIP.AUTO: 6.5 [PH] (ref 5–9)
PH, URINE: 6 (ref 5–9)
POST VOID RESIDUAL (PVR): 271 ML
PROT UR STRIP.AUTO-MCNC: NEGATIVE MG/DL
PROTEIN, URINE: NEGATIVE MG/DL
RBC #/AREA URNS HPF: ABNORMAL /HPF
RENAL EPI CELLS #/AREA URNS HPF: ABNORMAL /[HPF]
SPECIFIC GRAVITY UA: 1.01 (ref 1–1.03)
SPECIFIC GRAVITY, URINE: 1.01 (ref 1–1.03)
UROBILINOGEN, URINE: 0.2 EU/DL (ref 0–1)
UROBILINOGEN, URINE: 0.2 EU/DL (ref 0–1)
WBC #/AREA URNS HPF: ABNORMAL /HPF
YEAST LIKE FUNGI URNS QL MICRO: ABNORMAL

## 2024-04-24 PROCEDURE — 99214 OFFICE O/P EST MOD 30 MIN: CPT

## 2024-04-24 PROCEDURE — 81003 URINALYSIS AUTO W/O SCOPE: CPT

## 2024-04-24 PROCEDURE — 51798 US URINE CAPACITY MEASURE: CPT

## 2024-04-24 RX ORDER — VALACYCLOVIR HYDROCHLORIDE 500 MG/1
500 TABLET, FILM COATED ORAL DAILY
COMMUNITY
Start: 2021-04-02

## 2024-04-24 RX ORDER — NITROFURANTOIN 25; 75 MG/1; MG/1
100 CAPSULE ORAL 2 TIMES DAILY
Qty: 20 CAPSULE | Refills: 0 | Status: SHIPPED | OUTPATIENT
Start: 2024-04-24 | End: 2024-05-04

## 2024-04-24 ASSESSMENT — ENCOUNTER SYMPTOMS: CONSTIPATION: 1

## 2024-04-24 NOTE — TELEPHONE ENCOUNTER
Most common parasite in the urine is trichomonas. This is generally sexually transmitted. Does she have concerns for STI

## 2024-04-24 NOTE — PROGRESS NOTES
Urinalysis No Micro (Auto)   Result Value Ref Range    Glucose, Ur Negative NEGATIVE mg/dl    Bilirubin Urine Negative     Ketones, Urine Negative NEGATIVE    Specific Gravity, Urine 1.010 1.002 - 1.030    Blood, UA POC Small (A) NEGATIVE    pH, Urine 6.00 5.0 - 9.0    Protein, Urine Negative NEGATIVE mg/dl    Urobilinogen, Urine 0.20 0.0 - 1.0 eu/dl    Nitrite, Urine Negative NEGATIVE    Leukocyte Clumps, Urine Small (A) NEGATIVE    Color, Urine Yellow YELLOW-STRAW    Character, Urine Clear [id number]   poct post void residual   Result Value Ref Range    post void residual 271 ml            Recent BUN/Creatinine:  Lab Results   Component Value Date/Time    BUN 14 04/22/2015 10:36 AM    CREATININE 0.6 04/22/2015 10:36 AM       Radiology  No updated urologic imaging for review      Assessment:   Recurrent UTI  Elevated PVR  Family hx of bladder cancer- Grandpa    Plan:   Recurrent UTI  -D-Mannose, cranberry, probitics.  -Consume plenty of fluids, >60 oz per day   -avoid constipation, recommend bowel regimen   -Will send urine for guidance testing and urine micro (urine dip with small blood).     Family hx of bladder cancer  -Reports long hx of microhematuria. No gross hematuria.       -CT scan and cystoscopy for evaluation of recurrent UTI and reported long hx of microhematuria        Valentina Cano PA-C  Urology

## 2024-04-24 NOTE — TELEPHONE ENCOUNTER
Patient read something that some of the UTI could be caused by parasites in the bladder. Can the urine be tested for parasites?

## 2024-04-29 ENCOUNTER — TELEPHONE (OUTPATIENT)
Dept: UROLOGY | Age: 59
End: 2024-04-29

## 2024-04-29 NOTE — TELEPHONE ENCOUNTER
Patient advised of the urine results and does not have any kidney problems. She is taking macrobid already ordered on 04/24/2024 and the symptoms are getting better.

## 2024-04-29 NOTE — TELEPHONE ENCOUNTER
Reviewed patient's guidance. Remarkable for growth. Does she have any hx of kidney dysfunction. I do not have any labs to go off of. Labs in 2015 looked good in terms of kidney function.     Would like to send Macrobid    The patient should go to the ED should they develop fever, chills, nausea, vomiting, chest pain, SOB, calf pain, feelings of incomplete emptying, or should they otherwise feel they need evaluated

## 2024-05-02 ENCOUNTER — PATIENT MESSAGE (OUTPATIENT)
Dept: UROLOGY | Age: 59
End: 2024-05-02

## 2024-05-02 NOTE — TELEPHONE ENCOUNTER
From: Criselda Shepard  To: Valentina Cano  Sent: 5/2/2024 10:54 AM EDT  Subject: Macrobid    I now have a rash all over neck, chest and back. I think it’s the macrobid. I have 3 days left. I can muscle through if you want me to!

## 2024-05-02 NOTE — TELEPHONE ENCOUNTER
Stop Macrobid. She should be okay since she completed 7 days and had a low bacterial load.     Add macrobid to allergy list.     F/u as scheduled    Follow-up with PCP regarding rash, or present to ER if you feel it needs more urgent evaluation.    The patient should go to the ED should they develop fever, chills, nausea, vomiting, chest pain, SOB, calf pain, feelings of incomplete emptying, or should they otherwise feel they need evaluated

## 2024-05-18 ENCOUNTER — HOSPITAL ENCOUNTER (OUTPATIENT)
Dept: CT IMAGING | Age: 59
Discharge: HOME | End: 2024-05-18
Payer: COMMERCIAL

## 2024-05-18 DIAGNOSIS — N39.0 RECURRENT UTI: ICD-10-CM

## 2024-05-18 PROCEDURE — 74178 CT ABD&PLV WO CNTR FLWD CNTR: CPT

## 2024-05-18 PROCEDURE — 6360000004 HC RX CONTRAST MEDICATION

## 2024-05-18 RX ADMIN — IOPAMIDOL 80 ML: 755 INJECTION, SOLUTION INTRAVENOUS at 07:58

## 2024-05-22 ENCOUNTER — NURSE ONLY (OUTPATIENT)
Dept: LAB | Age: 59
End: 2024-05-22

## 2024-05-22 DIAGNOSIS — N39.0 RECURRENT UTI: ICD-10-CM

## 2024-05-22 LAB
BACTERIA: ABNORMAL
BILIRUB UR QL STRIP: NEGATIVE
CASTS #/AREA URNS LPF: ABNORMAL /LPF
CASTS #/AREA URNS LPF: ABNORMAL /LPF
CHARACTER UR: ABNORMAL
CHARCOAL URNS QL MICRO: ABNORMAL
COLOR UR: YELLOW
CRYSTALS URNS QL MICRO: ABNORMAL
EPITHELIAL CELLS, UA: ABNORMAL /HPF
GLUCOSE UR QL STRIP.AUTO: NEGATIVE MG/DL
HGB UR QL STRIP.AUTO: ABNORMAL
KETONES UR QL STRIP.AUTO: NEGATIVE
LEUKOCYTE ESTERASE UR QL STRIP.AUTO: ABNORMAL
NITRITE UR QL STRIP.AUTO: NEGATIVE
PH UR STRIP.AUTO: 6.5 [PH] (ref 5–9)
PROT UR STRIP.AUTO-MCNC: ABNORMAL MG/DL
RBC #/AREA URNS HPF: ABNORMAL /HPF
RENAL EPI CELLS #/AREA URNS HPF: ABNORMAL /[HPF]
SPECIFIC GRAVITY UA: 1.01 (ref 1–1.03)
UROBILINOGEN, URINE: 0.2 EU/DL (ref 0–1)
WBC #/AREA URNS HPF: > 200 /HPF
YEAST LIKE FUNGI URNS QL MICRO: ABNORMAL

## 2024-05-23 ENCOUNTER — TELEPHONE (OUTPATIENT)
Dept: UROLOGY | Age: 59
End: 2024-05-23

## 2024-05-23 NOTE — TELEPHONE ENCOUNTER
Urine micro microhematuria.     Patient already completed CT imaging    F/u as scheduled for scope on 5/30/2024

## 2024-05-24 ENCOUNTER — TELEPHONE (OUTPATIENT)
Dept: UROLOGY | Age: 59
End: 2024-05-24

## 2024-05-24 LAB
BACTERIA UR CULT: ABNORMAL
ORGANISM: ABNORMAL

## 2024-05-24 NOTE — TELEPHONE ENCOUNTER
F/u as scheduled for cysto    Still with microhematuria  Culture with no growth    The patient should go to the ED should they develop fever, chills, nausea, vomiting, chest pain, SOB, calf pain, feelings of incomplete emptying, or should they otherwise feel they need evaluated

## 2024-05-30 ENCOUNTER — PROCEDURE VISIT (OUTPATIENT)
Dept: UROLOGY | Age: 59
End: 2024-05-30
Payer: COMMERCIAL

## 2024-05-30 VITALS — BODY MASS INDEX: 29.71 KG/M2 | WEIGHT: 174 LBS | HEIGHT: 64 IN | RESPIRATION RATE: 14 BRPM

## 2024-05-30 DIAGNOSIS — N39.0 RECURRENT UTI: Primary | ICD-10-CM

## 2024-05-30 PROCEDURE — 99214 OFFICE O/P EST MOD 30 MIN: CPT | Performed by: UROLOGY

## 2024-05-30 PROCEDURE — 52000 CYSTOURETHROSCOPY: CPT | Performed by: UROLOGY

## 2024-05-30 RX ORDER — TIRZEPATIDE 2.5 MG/.5ML
INJECTION, SOLUTION SUBCUTANEOUS
COMMUNITY
Start: 2024-05-20

## 2024-05-30 RX ORDER — AZITHROMYCIN 500 MG/1
1000 TABLET, FILM COATED ORAL DAILY
Qty: 4 TABLET | Refills: 0 | Status: SHIPPED | OUTPATIENT
Start: 2024-05-30 | End: 2024-06-01

## 2024-05-30 RX ORDER — DOXYCYCLINE 100 MG/1
100 CAPSULE ORAL 2 TIMES DAILY
Qty: 28 CAPSULE | Refills: 0 | Status: SHIPPED | OUTPATIENT
Start: 2024-05-30 | End: 2024-06-13

## 2024-05-30 ASSESSMENT — ENCOUNTER SYMPTOMS: CONSTIPATION: 1

## 2024-05-30 NOTE — PROGRESS NOTES
[facility] [place] [facility]  [facility] UROLOGY  [address]  [address]  [facility] [address]  Dept: [phone]  Loc: [phone]    Visit Date: 5/30/2024        HPI:     HPI  Ms. Shepard is a 58-year-old female that presents as a new patient.     Reports concerns of recurrent UTI, worsening over the last 5-6 years. PCP has given patient 500 mg Keflex daily.     Patient was previously seen by Karon Orr CNP for recurrent UTI. At this time, she was advised to start cranberry, D-Mannose, and estrace cream. Prior to this, she was seen by Dr. Redd with [facility]. Cystoscopy and CT scan without  pathology. In 11/2015 she was seen and evaluated by Dr. Dietz with [facility] urology.     Family hx of bladder cancer- grandpa. Ms. Shepard notes long hx of microhematuria. No gross hematuria.   I independently reviewed and verified the images and reports from:    CT UROGRAM    Result Date: 5/18/2024  PROCEDURE: CT UROGRAM CLINICAL INFORMATION: Recurrent UTI TECHNIQUE: CT urogram of the abdomen and pelvis was performed before and following administration of 80 mL Isovue 370 intravenous contrast. Delayed axial images were obtained at 2 and 8 minutes following contrast administration with coronal and sagittal reconstructions obtained at 8 minutes. All CT scans at this facility use dose modulation, iterative reconstruction, and/or weight-based dosing when appropriate to reduce radiation dose to as low as reasonably achievable. COMPARISON: CT abdomen and pelvis 1/12/2015 FINDINGS: Lower thorax: There is a small calcified granuloma at the left lung base. There is no pleural effusion. Kidneys, ureters and bladder: There is no hydronephrosis or urolithiasis. There are bilateral parapelvic cysts. There is normal renal uptake, excretion and clearance of contrast. No filling defects are identified in the ureters. The urinary bladder is unremarkable. ABDOMEN: There is no free intraperitoneal air or fluid.

## 2024-06-09 NOTE — PATIENT INSTRUCTIONS
-Take D-Mannose and Cranberry, probiotics   -Consume plenty of fluids, >60 oz per day   -avoid constipation, recommend bowel regimen   -Start Macrobid   -I will send your urine for additional studies and call with abnormal results.   -Get CT scan  -Schedule cystoscopy   -Follow-up with results       Foods and drinks that can be irritating to the bladder and contribute to pain:        stated

## 2025-03-25 ENCOUNTER — HOSPITAL ENCOUNTER (EMERGENCY)
Age: 60
Discharge: HOME | End: 2025-03-25
Payer: COMMERCIAL

## 2025-03-25 ENCOUNTER — APPOINTMENT (OUTPATIENT)
Dept: CT IMAGING | Age: 60
End: 2025-03-25
Payer: COMMERCIAL

## 2025-03-25 VITALS
SYSTOLIC BLOOD PRESSURE: 122 MMHG | HEIGHT: 64 IN | RESPIRATION RATE: 18 BRPM | DIASTOLIC BLOOD PRESSURE: 75 MMHG | TEMPERATURE: 98.3 F | WEIGHT: 158 LBS | BODY MASS INDEX: 26.98 KG/M2 | HEART RATE: 74 BPM | OXYGEN SATURATION: 96 %

## 2025-03-25 DIAGNOSIS — K57.32 DIVERTICULITIS OF COLON: Primary | ICD-10-CM

## 2025-03-25 LAB
ALBUMIN SERPL BCG-MCNC: 4 G/DL (ref 3.4–4.9)
ALP SERPL-CCNC: 68 U/L (ref 35–104)
ALT SERPL W/O P-5'-P-CCNC: 30 U/L (ref 10–35)
ANION GAP SERPL CALC-SCNC: 12 MEQ/L (ref 8–16)
AST SERPL-CCNC: 26 U/L (ref 10–35)
BASOPHILS ABSOLUTE: 0 THOU/MM3 (ref 0–0.1)
BASOPHILS NFR BLD AUTO: 0.4 %
BILIRUB CONJ SERPL-MCNC: < 0.1 MG/DL (ref 0–0.2)
BILIRUB SERPL-MCNC: 0.2 MG/DL (ref 0.3–1.2)
BUN SERPL-MCNC: 27 MG/DL (ref 8–23)
CALCIUM SERPL-MCNC: 9.2 MG/DL (ref 8.6–10)
CHLORIDE SERPL-SCNC: 109 MEQ/L (ref 98–111)
CO2 SERPL-SCNC: 20 MEQ/L (ref 22–29)
CREAT SERPL-MCNC: 0.5 MG/DL (ref 0.5–0.9)
DEPRECATED RDW RBC AUTO: 46.5 FL (ref 35–45)
EOSINOPHIL NFR BLD AUTO: 2.7 %
EOSINOPHILS ABSOLUTE: 0.3 THOU/MM3 (ref 0–0.4)
ERYTHROCYTE [DISTWIDTH] IN BLOOD BY AUTOMATED COUNT: 13 % (ref 11.5–14.5)
GFR SERPL CREATININE-BSD FRML MDRD: > 90 ML/MIN/1.73M2
GLUCOSE SERPL-MCNC: 97 MG/DL (ref 74–109)
HCT VFR BLD AUTO: 43.7 % (ref 37–47)
HGB BLD-MCNC: 14.2 GM/DL (ref 12–16)
IMM GRANULOCYTES # BLD AUTO: 0.03 THOU/MM3 (ref 0–0.07)
IMM GRANULOCYTES NFR BLD AUTO: 0.3 %
LACTATE SERPL-SCNC: 0.8 MMOL/L (ref 0.5–2)
LYMPHOCYTES ABSOLUTE: 2.4 THOU/MM3 (ref 1–4.8)
LYMPHOCYTES NFR BLD AUTO: 25.1 %
MCH RBC QN AUTO: 31.5 PG (ref 26–33)
MCHC RBC AUTO-ENTMCNC: 32.5 GM/DL (ref 32.2–35.5)
MCV RBC AUTO: 96.9 FL (ref 81–99)
MONOCYTES ABSOLUTE: 0.9 THOU/MM3 (ref 0.4–1.3)
MONOCYTES NFR BLD AUTO: 9.6 %
NEUTROPHILS ABSOLUTE: 5.9 THOU/MM3 (ref 1.8–7.7)
NEUTROPHILS NFR BLD AUTO: 61.9 %
NRBC BLD AUTO-RTO: 0 /100 WBC
OSMOLALITY SERPL CALC.SUM OF ELEC: 286.3 MOSMOL/KG (ref 275–300)
PLATELET # BLD AUTO: 275 THOU/MM3 (ref 130–400)
PMV BLD AUTO: 10 FL (ref 9.4–12.4)
POTASSIUM SERPL-SCNC: 4.1 MEQ/L (ref 3.5–5.2)
PROT SERPL-MCNC: 6.9 G/DL (ref 6.4–8.3)
RBC # BLD AUTO: 4.51 MILL/MM3 (ref 4.2–5.4)
SODIUM SERPL-SCNC: 141 MEQ/L (ref 135–145)
T4 FREE SERPL-MCNC: 1 NG/DL (ref 0.92–1.68)
TSH SERPL DL<=0.05 MIU/L-ACNC: 5.74 UIU/ML (ref 0.27–4.2)
WBC # BLD AUTO: 9.6 THOU/MM3 (ref 4.8–10.8)

## 2025-03-25 PROCEDURE — 85025 COMPLETE CBC W/AUTO DIFF WBC: CPT

## 2025-03-25 PROCEDURE — 74177 CT ABD & PELVIS W/CONTRAST: CPT

## 2025-03-25 PROCEDURE — 84443 ASSAY THYROID STIM HORMONE: CPT

## 2025-03-25 PROCEDURE — 2580000003 HC RX 258: Performed by: PHYSICIAN ASSISTANT

## 2025-03-25 PROCEDURE — 6360000002 HC RX W HCPCS: Performed by: PHYSICIAN ASSISTANT

## 2025-03-25 PROCEDURE — 96374 THER/PROPH/DIAG INJ IV PUSH: CPT

## 2025-03-25 PROCEDURE — 96375 TX/PRO/DX INJ NEW DRUG ADDON: CPT

## 2025-03-25 PROCEDURE — 6360000004 HC RX CONTRAST MEDICATION: Performed by: PHYSICIAN ASSISTANT

## 2025-03-25 PROCEDURE — 6370000000 HC RX 637 (ALT 250 FOR IP): Performed by: PHYSICIAN ASSISTANT

## 2025-03-25 PROCEDURE — 83605 ASSAY OF LACTIC ACID: CPT

## 2025-03-25 PROCEDURE — 84439 ASSAY OF FREE THYROXINE: CPT

## 2025-03-25 PROCEDURE — 82248 BILIRUBIN DIRECT: CPT

## 2025-03-25 PROCEDURE — 80053 COMPREHEN METABOLIC PANEL: CPT

## 2025-03-25 PROCEDURE — 36415 COLL VENOUS BLD VENIPUNCTURE: CPT

## 2025-03-25 PROCEDURE — 99285 EMERGENCY DEPT VISIT HI MDM: CPT

## 2025-03-25 RX ORDER — MORPHINE SULFATE 4 MG/ML
4 INJECTION, SOLUTION INTRAMUSCULAR; INTRAVENOUS ONCE
Status: COMPLETED | OUTPATIENT
Start: 2025-03-25 | End: 2025-03-25

## 2025-03-25 RX ORDER — KETOROLAC TROMETHAMINE 30 MG/ML
30 INJECTION, SOLUTION INTRAMUSCULAR; INTRAVENOUS ONCE
Status: COMPLETED | OUTPATIENT
Start: 2025-03-25 | End: 2025-03-25

## 2025-03-25 RX ORDER — TRAMADOL HYDROCHLORIDE 50 MG/1
50 TABLET ORAL EVERY 6 HOURS PRN
Qty: 12 TABLET | Refills: 0 | Status: SHIPPED | OUTPATIENT
Start: 2025-03-25 | End: 2025-03-28

## 2025-03-25 RX ORDER — 0.9 % SODIUM CHLORIDE 0.9 %
1000 INTRAVENOUS SOLUTION INTRAVENOUS ONCE
Status: COMPLETED | OUTPATIENT
Start: 2025-03-25 | End: 2025-03-25

## 2025-03-25 RX ORDER — IOPAMIDOL 755 MG/ML
80 INJECTION, SOLUTION INTRAVASCULAR
Status: COMPLETED | OUTPATIENT
Start: 2025-03-25 | End: 2025-03-25

## 2025-03-25 RX ORDER — ONDANSETRON 2 MG/ML
4 INJECTION INTRAMUSCULAR; INTRAVENOUS ONCE
Status: COMPLETED | OUTPATIENT
Start: 2025-03-25 | End: 2025-03-25

## 2025-03-25 RX ADMIN — MORPHINE SULFATE 4 MG: 4 INJECTION, SOLUTION INTRAMUSCULAR; INTRAVENOUS at 04:52

## 2025-03-25 RX ADMIN — IOPAMIDOL 80 ML: 755 INJECTION, SOLUTION INTRAVENOUS at 05:02

## 2025-03-25 RX ADMIN — AMOXICILLIN AND CLAVULANATE POTASSIUM 1 TABLET: 875; 125 TABLET, FILM COATED ORAL at 06:06

## 2025-03-25 RX ADMIN — KETOROLAC TROMETHAMINE 30 MG: 30 INJECTION, SOLUTION INTRAMUSCULAR at 06:05

## 2025-03-25 RX ADMIN — ONDANSETRON 4 MG: 2 INJECTION INTRAMUSCULAR; INTRAVENOUS at 04:42

## 2025-03-25 RX ADMIN — SODIUM CHLORIDE 1000 ML: 0.9 INJECTION, SOLUTION INTRAVENOUS at 04:48

## 2025-03-25 ASSESSMENT — PAIN SCALES - GENERAL
PAINLEVEL_OUTOF10: 9
PAINLEVEL_OUTOF10: 8
PAINLEVEL_OUTOF10: 9

## 2025-03-25 ASSESSMENT — ENCOUNTER SYMPTOMS
VOMITING: 0
BLOOD IN STOOL: 0
NAUSEA: 1
CONSTIPATION: 0
ABDOMINAL PAIN: 1
ABDOMINAL DISTENTION: 1
DIARRHEA: 0

## 2025-03-25 ASSESSMENT — PAIN DESCRIPTION - ONSET: ONSET: ON-GOING

## 2025-03-25 ASSESSMENT — PAIN DESCRIPTION - LOCATION
LOCATION: ABDOMEN

## 2025-03-25 ASSESSMENT — PAIN - FUNCTIONAL ASSESSMENT: PAIN_FUNCTIONAL_ASSESSMENT: 0-10

## 2025-03-25 ASSESSMENT — PAIN DESCRIPTION - FREQUENCY: FREQUENCY: CONTINUOUS

## 2025-03-25 NOTE — ED PROVIDER NOTES
[facility] EMERGENCY DEPT      Pt Name: Criselda Shepard  MRN: [id number]  Birthdate 1965  Date of evaluation: 3/25/2025  Provider: Brenda Garza PA-C    CHIEF COMPLAINT       Chief Complaint   Patient presents with    Diverticulitis       Nurses Notes reviewed and I agree except as noted in the HPI.      HISTORY OF PRESENT ILLNESS    Criselda Shepard is a 59 y.o. female who presents for 9 hours of abdominal pain. She reports prior diagnosis of diverticulitis stating she has occasional episodes of abdominal pain but this pain is worse than before. She was having dinner last night at 7 pm when she noticed the pain onset. She had a bowel movement about an hour later that appeared normal but did not alleviate any of the pain. Previously she mentions having a dental abscess treated in which they gave her pain medication leading to a week of constipation that has now resolved. She has not taken any medication to help with this acute episode. Patient also reports a history of chronic UTI but denies any changes in urination. She admits to nausea, bloating, abdominal distension, and tenderness with movement. She denies fevers, blood in stool and unintentional weight loss.     Location/Symptom: LLQ abdominal pain  Timing/Onset: 9 hours around dinner time last night  Context/Setting: Was out for dinner and began to experience he abdominal pain   Quality: constant pain  Duration: 9 hours  Modifying Factors: Pain to palpation on abdomen and with deep breath  Severity: Moderate    REVIEW OF SYSTEMS     Review of Systems   Constitutional:  Positive for appetite change. Negative for fever and unexpected weight change.   Gastrointestinal:  Positive for abdominal distention, abdominal pain and nausea. Negative for blood in stool, constipation, diarrhea and vomiting.   Genitourinary:  Negative for difficulty urinating, frequency and urgency.        PAST MEDICAL HISTORY    has a past medical history of Diverticula, colon and

## 2025-03-25 NOTE — ED TRIAGE NOTES
Pt presents to ED via lobby for evaluation of diverticulitis flare up. Pt states hx of diverticulitis. Pt states onset of pain approx. 1900 on 3/24. Pt rates current pain 9/10, denies medications prior to arrival. Denies CP or SOB. Vitals, labs obtained.

## 2025-03-25 NOTE — ED NOTES
Pt resting in bed at this time, medicated per MAR. No concerns voiced. Call light in reach. Family at bedside.

## 2025-04-01 ASSESSMENT — ENCOUNTER SYMPTOMS
ANAL BLEEDING: 0
SHORTNESS OF BREATH: 0

## 2025-04-29 ENCOUNTER — LAB (OUTPATIENT)
Dept: LAB | Age: 60
End: 2025-04-29

## 2025-04-29 LAB
25(OH)D3 SERPL-MCNC: 34 NG/ML (ref 30–100)
ALBUMIN SERPL BCG-MCNC: 4.2 G/DL (ref 3.4–4.9)
ALP SERPL-CCNC: 60 U/L (ref 38–126)
ALT SERPL W/O P-5'-P-CCNC: 27 U/L (ref 10–35)
ANION GAP SERPL CALC-SCNC: 10 MEQ/L (ref 8–16)
AST SERPL-CCNC: 32 U/L (ref 10–35)
BILIRUB SERPL-MCNC: 0.3 MG/DL (ref 0.3–1.2)
BUN SERPL-MCNC: 19 MG/DL (ref 8–23)
CALCIUM SERPL-MCNC: 9.7 MG/DL (ref 8.6–10)
CHLORIDE SERPL-SCNC: 105 MEQ/L (ref 98–111)
CHOLEST SERPL-MCNC: 194 MG/DL (ref 100–199)
CO2 SERPL-SCNC: 23 MEQ/L (ref 22–29)
CREAT SERPL-MCNC: 0.5 MG/DL (ref 0.5–0.9)
DEPRECATED MEAN GLUCOSE BLD GHB EST-ACNC: 102 MG/DL (ref 70–126)
DEPRECATED RDW RBC AUTO: 42.9 FL (ref 35–45)
ERYTHROCYTE [DISTWIDTH] IN BLOOD BY AUTOMATED COUNT: 12.1 % (ref 11.5–14.5)
GFR SERPL CREATININE-BSD FRML MDRD: > 90 ML/MIN/1.73M2
GLUCOSE 2H P MEAL SERPL-MCNC: 66 MG/DL (ref 70–108)
GLUCOSE SERPL-MCNC: 67 MG/DL (ref 74–109)
HBA1C MFR BLD HPLC: 5.4 % (ref 4–6)
HCT VFR BLD AUTO: 46 % (ref 37–47)
HDLC SERPL-MCNC: 52 MG/DL
HGB BLD-MCNC: 15.2 GM/DL (ref 12–16)
LDLC SERPL CALC-MCNC: 121 MG/DL
MCH RBC QN AUTO: 31.7 PG (ref 26–33)
MCHC RBC AUTO-ENTMCNC: 33 GM/DL (ref 32.2–35.5)
MCV RBC AUTO: 95.8 FL (ref 81–99)
PLATELET # BLD AUTO: 244 THOU/MM3 (ref 130–400)
PMV BLD AUTO: 10.2 FL (ref 9.4–12.4)
POTASSIUM SERPL-SCNC: 4.3 MEQ/L (ref 3.5–5.2)
PROT SERPL-MCNC: 7.2 G/DL (ref 6.4–8.3)
RBC # BLD AUTO: 4.8 MILL/MM3 (ref 4.2–5.4)
SODIUM SERPL-SCNC: 138 MEQ/L (ref 135–145)
T4 FREE SERPL-MCNC: 0.9 NG/DL (ref 0.92–1.68)
TRIGL SERPL-MCNC: 105 MG/DL (ref 0–199)
TSH SERPL DL<=0.05 MIU/L-ACNC: 1.07 UIU/ML (ref 0.27–4.2)
WBC # BLD AUTO: 5.7 THOU/MM3 (ref 4.8–10.8)

## 2025-04-30 LAB
INSULIN SERPL-ACNC: 7.9 MU/L
T3FREE SERPL-MCNC: 3.25 PG/ML (ref 2–4.4)
THYROPEROXIDASE AB SERPL IA-ACNC: 230 IU/ML (ref 0–25)

## 2025-05-01 ENCOUNTER — RESULTS FOLLOW-UP (OUTPATIENT)
Dept: FAMILY MEDICINE CLINIC | Age: 60
End: 2025-05-01

## 2025-05-01 LAB — SHBG SERPL-SCNC: NORMAL NMOL/L

## 2025-05-02 LAB — T3REVERSE SERPL-MCNC: 11 NG/DL (ref 9–27)

## 2025-05-19 ENCOUNTER — RESULTS FOLLOW-UP (OUTPATIENT)
Dept: UROLOGY | Age: 60
End: 2025-05-19

## 2025-05-28 ENCOUNTER — OFFICE VISIT (OUTPATIENT)
Dept: UROLOGY | Age: 60
End: 2025-05-28
Payer: COMMERCIAL

## 2025-05-28 VITALS — HEIGHT: 64 IN | RESPIRATION RATE: 18 BRPM | BODY MASS INDEX: 27.66 KG/M2 | WEIGHT: 162 LBS

## 2025-05-28 DIAGNOSIS — N39.0 RECURRENT UTI: Primary | ICD-10-CM

## 2025-05-28 LAB
BACTERIA: ABNORMAL
BILIRUB UR QL STRIP: NEGATIVE
BILIRUBIN, URINE: NEGATIVE
BLOOD URINE, POC: ABNORMAL
CASTS #/AREA URNS LPF: ABNORMAL /LPF
CASTS #/AREA URNS LPF: ABNORMAL /LPF
CHARACTER UR: ABNORMAL
CHARACTER, URINE: CLEAR
CHARCOAL URNS QL MICRO: ABNORMAL
COLOR UR: YELLOW
COLOR, UA: YELLOW
CRYSTALS URNS QL MICRO: ABNORMAL
EPITHELIAL CELLS, UA: ABNORMAL /HPF
GLUCOSE UR QL STRIP.AUTO: NEGATIVE MG/DL
GLUCOSE URINE: NEGATIVE MG/DL
HGB UR QL STRIP.AUTO: NEGATIVE
KETONES UR QL STRIP.AUTO: NEGATIVE
KETONES, URINE: NEGATIVE
LEUKOCYTE CLUMPS, URINE: ABNORMAL
LEUKOCYTE ESTERASE UR QL STRIP.AUTO: ABNORMAL
NITRITE UR QL STRIP.AUTO: NEGATIVE
NITRITE, URINE: NEGATIVE
PH UR STRIP.AUTO: 7.5 [PH] (ref 5–9)
PH, URINE: 7 (ref 5–9)
POST VOID RESIDUAL (PVR): 48 ML
PROT UR STRIP.AUTO-MCNC: NEGATIVE MG/DL
PROTEIN, URINE: NEGATIVE MG/DL
RBC #/AREA URNS HPF: ABNORMAL /HPF
RENAL EPI CELLS #/AREA URNS HPF: ABNORMAL /[HPF]
SPECIFIC GRAVITY UA: 1.01 (ref 1–1.03)
SPECIFIC GRAVITY UA: 1.02 (ref 1–1.03)
UROBILINOGEN, URINE: 0.2 EU/DL (ref 0–1)
UROBILINOGEN, URINE: 0.2 EU/DL (ref 0–1)
WBC #/AREA URNS HPF: ABNORMAL /HPF
YEAST LIKE FUNGI URNS QL MICRO: ABNORMAL

## 2025-05-28 PROCEDURE — G8419 CALC BMI OUT NRM PARAM NOF/U: HCPCS

## 2025-05-28 PROCEDURE — 3017F COLORECTAL CA SCREEN DOC REV: CPT

## 2025-05-28 PROCEDURE — G8427 DOCREV CUR MEDS BY ELIG CLIN: HCPCS

## 2025-05-28 PROCEDURE — 99214 OFFICE O/P EST MOD 30 MIN: CPT

## 2025-05-28 PROCEDURE — 81003 URINALYSIS AUTO W/O SCOPE: CPT

## 2025-05-28 PROCEDURE — 1036F TOBACCO NON-USER: CPT

## 2025-05-28 PROCEDURE — 51798 US URINE CAPACITY MEASURE: CPT

## 2025-05-28 RX ORDER — AZITHROMYCIN 500 MG/1
1000 TABLET, FILM COATED ORAL ONCE
Qty: 2 TABLET | Refills: 0 | Status: SHIPPED | OUTPATIENT
Start: 2025-05-28 | End: 2025-05-28

## 2025-05-28 RX ORDER — DOXYCYCLINE HYCLATE 100 MG
100 TABLET ORAL 2 TIMES DAILY
Qty: 20 TABLET | Refills: 0 | Status: SHIPPED | OUTPATIENT
Start: 2025-05-28 | End: 2025-06-07

## 2025-05-28 RX ORDER — SULFAMETHOXAZOLE AND TRIMETHOPRIM 800; 160 MG/1; MG/1
1 TABLET ORAL DAILY PRN
Qty: 30 TABLET | Refills: 1 | Status: SHIPPED | OUTPATIENT
Start: 2025-05-28

## 2025-05-28 NOTE — PROGRESS NOTES
[facility] [place] [facility]  [facility] UROLOGY  [address]  [address]  [facility] [address]  Dept: [phone]  Loc: [phone]    Visit Date: 5/28/2025        HPI:     HPI  Ms. Shepard is a 59-year-old female that presents in follow-up.      Reports concerns of recurrent UTI, worsening over the last 5-6 years. Reports that symptoms previously had resolved for more than 6 months after treatment with azithromycin and doxycyline. UTIs notably occur after intercourse. Endorses dysuria, back pain, and frequency in the setting of a UTI. Unable to tolerate Cranberry. She is planning to start an estrogen patch per her holistic doctor, and is already taking progesterone. CTU in 5/2024 notable for Bosniak 1 parapelvic renal cysts. No abnormal findings during cystoscopy in 5/2024 with Dr. Fine. More recnet  CT imaging in 3/2025 was also without acute  pathology.      Patient was previously seen by Karon Orr CNP for recurrent UTI. At this time, she was advised to start cranberry, D-Mannose, and estrace cream. Prior to this, she was seen by Dr. Redd with [facility]. Cystoscopy and CT scan without  pathology. In 11/2015 she was seen and evaluated by Dr. Dietz with [facility] urology.      Family hx of bladder cancer- grandpa. Ms. Shepard notes long hx of microhematuria. No gross hematuria.     Current Outpatient Medications   Medication Sig Dispense Refill    Phenazopyridine HCl (AZO TABS PO) Take by mouth as needed (dysuria)      doxycycline hyclate (VIBRA-TABS) 100 MG tablet Take 1 tablet by mouth 2 times daily for 10 days 20 tablet 0    azithromycin (ZITHROMAX) 500 MG tablet Take 2 tablets by mouth once for 1 dose 2 tablet 0    sulfamethoxazole-trimethoprim (BACTRIM DS;SEPTRA DS) 800-160 MG per tablet Take 1 tablet by mouth daily as needed (take as needed after intercourse) 30 tablet 1    NONFORMULARY daily Bosuellia      NONFORMULARY daily Cruciferous      NONFORMULARY daily 5HTP

## 2025-05-28 NOTE — PATIENT INSTRUCTIONS
Continue D-Mannose and probiotics  Start doxycyline and azithromycin  Start Macrobid after intercourse   I will send your urine for additional studies and call with abnormal results.   F/u in 6 months  Call with questions, comments, or concerns. I recommend going to the ED for further evaluation if you develop fever, chills, nausea, vomiting, chest pain, SOB, or calf pain.    The medication list included in this document is our record of what you are currently taking, including any changes that were made at today's visit.  If you find any differences when compared to your medications at home, or have any questions that were not answered at your visit, please contact the office.

## 2025-05-29 ENCOUNTER — RESULTS FOLLOW-UP (OUTPATIENT)
Dept: UROLOGY | Age: 60
End: 2025-05-29

## 2025-05-29 NOTE — RESULT ENCOUNTER NOTE
No microhematuria.   Sensitivities pending.  Doxycyline and azithromycin sent empirically. She will otherwise take Bactrim DS x 1 after intercourse as a preventative measure.

## 2025-05-30 LAB
BACTERIA UR CULT: ABNORMAL
ORGANISM: ABNORMAL

## 2025-05-30 NOTE — RESULT ENCOUNTER NOTE
Culture reviewed.  Low colony count of E. coli.  Finish doxycycline and azithromycin as prescribed.  This should cover the E. coli.  After finishing this antibiotic regimen recommend taking Bactrim x 1 after intercourse.    The patient should go to the ED should they develop fever, chills, nausea, vomiting, significant gross hematuria, chest pain, SOB, calf pain, feelings of incomplete emptying, or should they otherwise feel they need evaluated.    Please have the patient follow-up as scheduled or sooner if needed